# Patient Record
Sex: FEMALE | Race: WHITE | NOT HISPANIC OR LATINO | Employment: OTHER | RURAL
[De-identification: names, ages, dates, MRNs, and addresses within clinical notes are randomized per-mention and may not be internally consistent; named-entity substitution may affect disease eponyms.]

---

## 2022-08-14 ENCOUNTER — HOSPITAL ENCOUNTER (EMERGENCY)
Facility: HOSPITAL | Age: 77
Discharge: HOME | End: 2022-08-14
Attending: SPECIALIST
Payer: MEDICARE

## 2022-08-14 VITALS
RESPIRATION RATE: 18 BRPM | BODY MASS INDEX: 20.83 KG/M2 | DIASTOLIC BLOOD PRESSURE: 71 MMHG | HEART RATE: 76 BPM | WEIGHT: 125 LBS | TEMPERATURE: 98 F | SYSTOLIC BLOOD PRESSURE: 128 MMHG | OXYGEN SATURATION: 99 % | HEIGHT: 65 IN

## 2022-08-14 DIAGNOSIS — S81.811A LACERATION OF LOWER EXTREMITY, RIGHT, INITIAL ENCOUNTER: Primary | ICD-10-CM

## 2022-08-14 DIAGNOSIS — S90.30XA CONTUSION OF DORSUM OF FOOT: ICD-10-CM

## 2022-08-14 DIAGNOSIS — S81.811A LACERATION OF RIGHT LOWER EXTREMITY, INITIAL ENCOUNTER: ICD-10-CM

## 2022-08-14 PROCEDURE — 63600175 PHARM REV CODE 636 W HCPCS: Performed by: SPECIALIST

## 2022-08-14 PROCEDURE — 96372 THER/PROPH/DIAG INJ SC/IM: CPT

## 2022-08-14 PROCEDURE — 12032 INTMD RPR S/A/T/EXT 2.6-7.5: CPT | Performed by: SPECIALIST

## 2022-08-14 PROCEDURE — 12042 INTMD RPR N-HF/GENIT2.6-7.5: CPT

## 2022-08-14 PROCEDURE — 99284 EMERGENCY DEPT VISIT MOD MDM: CPT | Mod: 25

## 2022-08-14 PROCEDURE — 99283 EMERGENCY DEPT VISIT LOW MDM: CPT | Mod: 25 | Performed by: SPECIALIST

## 2022-08-14 PROCEDURE — 25000003 PHARM REV CODE 250: Performed by: SPECIALIST

## 2022-08-14 RX ORDER — ONDANSETRON 4 MG/1
8 TABLET, ORALLY DISINTEGRATING ORAL
Status: COMPLETED | OUTPATIENT
Start: 2022-08-14 | End: 2022-08-14

## 2022-08-14 RX ORDER — CELECOXIB 200 MG/1
200 CAPSULE ORAL
COMMUNITY

## 2022-08-14 RX ORDER — APIXABAN 5 MG/1
5 TABLET, FILM COATED ORAL 2 TIMES DAILY
COMMUNITY
Start: 2022-07-28

## 2022-08-14 RX ORDER — LIDOCAINE HYDROCHLORIDE 10 MG/ML
10 INJECTION, SOLUTION EPIDURAL; INFILTRATION; INTRACAUDAL; PERINEURAL
Status: COMPLETED | OUTPATIENT
Start: 2022-08-14 | End: 2022-08-14

## 2022-08-14 RX ORDER — CHOLECALCIFEROL (VITAMIN D3) 25 MCG
2500 TABLET,CHEWABLE ORAL
COMMUNITY

## 2022-08-14 RX ORDER — HYDROCODONE BITARTRATE AND ACETAMINOPHEN 5; 325 MG/1; MG/1
1 TABLET ORAL EVERY 4 HOURS PRN
Qty: 8 TABLET | Refills: 0 | Status: SHIPPED | OUTPATIENT
Start: 2022-08-14

## 2022-08-14 RX ORDER — ALBUTEROL SULFATE 90 UG/1
1-2 AEROSOL, METERED RESPIRATORY (INHALATION)
COMMUNITY

## 2022-08-14 RX ORDER — SULFAMETHOXAZOLE AND TRIMETHOPRIM 800; 160 MG/1; MG/1
1 TABLET ORAL
Status: COMPLETED | OUTPATIENT
Start: 2022-08-14 | End: 2022-08-14

## 2022-08-14 RX ORDER — GABAPENTIN 300 MG/1
300 CAPSULE ORAL
COMMUNITY

## 2022-08-14 RX ORDER — BUSPIRONE HYDROCHLORIDE 15 MG/1
15 TABLET ORAL 2 TIMES DAILY
COMMUNITY
Start: 2022-06-08

## 2022-08-14 RX ORDER — DILTIAZEM HYDROCHLORIDE 240 MG/1
240 CAPSULE, COATED, EXTENDED RELEASE ORAL DAILY
COMMUNITY
Start: 2022-06-08

## 2022-08-14 RX ORDER — SULFAMETHOXAZOLE AND TRIMETHOPRIM 800; 160 MG/1; MG/1
1 TABLET ORAL 2 TIMES DAILY
Qty: 14 TABLET | Refills: 0 | Status: SHIPPED | OUTPATIENT
Start: 2022-08-14 | End: 2022-08-21

## 2022-08-14 RX ORDER — ZOLPIDEM TARTRATE 10 MG/1
10 TABLET ORAL NIGHTLY
COMMUNITY
Start: 2022-08-05

## 2022-08-14 RX ORDER — MORPHINE SULFATE 2 MG/ML
2 INJECTION, SOLUTION INTRAMUSCULAR; INTRAVENOUS
Status: COMPLETED | OUTPATIENT
Start: 2022-08-14 | End: 2022-08-14

## 2022-08-14 RX ORDER — OMEPRAZOLE 40 MG/1
40 CAPSULE, DELAYED RELEASE ORAL
COMMUNITY

## 2022-08-14 RX ADMIN — LIDOCAINE HYDROCHLORIDE 100 MG: 10 INJECTION, SOLUTION EPIDURAL; INFILTRATION; INTRACAUDAL; PERINEURAL at 04:08

## 2022-08-14 RX ADMIN — MORPHINE SULFATE 2 MG: 2 INJECTION, SOLUTION INTRAMUSCULAR; INTRAVENOUS at 04:08

## 2022-08-14 RX ADMIN — SULFAMETHOXAZOLE AND TRIMETHOPRIM 1 TABLET: 800; 160 TABLET ORAL at 05:08

## 2022-08-14 RX ADMIN — ONDANSETRON 8 MG: 4 TABLET, ORALLY DISINTEGRATING ORAL at 04:08

## 2022-08-14 RX ADMIN — BACITRACIN ZINC, NEOMYCIN SULFATE, POLYMYXIN B SULFATE: 3.5; 5000; 4 OINTMENT TOPICAL at 05:08

## 2022-08-14 NOTE — ED NOTES
Pt to er 5 on arrival- pt anxious and hollering - pt states your not putting that nonsterile water on me as nurse reaches for washcloth to wash dirt and blood off of patients foot- pt holding towel around lower leg- pt states your not touching me- explained to pt that I could not help her unless she let me

## 2022-08-14 NOTE — DISCHARGE INSTRUCTIONS
Elevate right leg when at home  DO NOT WALK AT HOME WITHOUT CRUTCHES OR WALKER AND DO NOT PUT PRESSURE ON RIGHT LEG  FOLLOW UP IN ER IN 24 HRS FOR REEVALUATION OR AT PCP  FOLLOW UP IN 10 DAYS FOR PROBABLE SUTURE REMOVAL

## 2022-08-14 NOTE — ED NOTES
Wound cleansed with saline and hibiclens -antibiotic ointment applied and nonstick bandage- pt tolerated - covered with 4x4's and jack- ace wrap applied as ordered - circulation wnl's

## 2022-08-14 NOTE — ED NOTES
Dr simon aware of pt arrival - pt sister came to nurses station stating she needs to go to bathroom- pt assisted to wheelchair - pt then assisted to bathroom - pt, you put your finger right here and hold my towel intact until I get done -pt states only one finger and dont you move- assisted pt back to room after voiding - explained to pt that I had to clean her wound for her doctor to see her and that know one was going to do anything to hurt- pt now states whats your name and where are you from - explained my name- pt sister states you know her mom and explained who nurse was - pt became calmer - pt now allowing cleansing of wound- pt states her great nicolas and yordan were playing and her great nicolas landed on her foot on and cut her with her toenail - bleeding controlled- wound cleansed with saline and hibiclens and irrigation performed

## 2022-08-14 NOTE — ED PROVIDER NOTES
Encounter Date: 8/14/2022       History     Chief Complaint   Patient presents with    Laceration     Pt  to er via wheelchair with c/o laceration to leg prior to arrival - states her dog didn't meant to hurt her     Patient is a 77 yo wf who comes to the ER for evaluation and treatment of a large laceration on the right calf.  Patient states that her Great Alexander was playing with her smaller dog today and by mistake clawed her leg and stepped on her right foot. She is in pain.  The lac measures 6 cm x 1.1 cm right calf.         Review of patient's allergies indicates:   Allergen Reactions    Penicillins Hives     History reviewed. No pertinent past medical history.  History reviewed. No pertinent surgical history.  History reviewed. No pertinent family history.  Social History     Tobacco Use    Smoking status: Former Smoker    Smokeless tobacco: Never Used   Substance Use Topics    Alcohol use: Yes     Review of Systems   Constitutional: Negative.    HENT: Negative.    Eyes: Negative.    Respiratory: Negative.    Endocrine: Negative.    Genitourinary: Negative.    Musculoskeletal: Negative.         Right calf pain and lac   Skin: Positive for wound.        Laceration right calf 6 cm x 1.1 cm non bleeding fascia intact.  Patients skin is extremely thin and crepy   Allergic/Immunologic: Negative.    Neurological: Negative.    Hematological: Negative.    Psychiatric/Behavioral: Negative.    All other systems reviewed and are negative.      Physical Exam     Initial Vitals [08/14/22 1553]   BP Pulse Resp Temp SpO2   136/84 82 (!) 22 98.2 °F (36.8 °C) 98 %      MAP       --         Physical Exam    Nursing note and vitals reviewed.  Constitutional: She appears well-developed and well-nourished. She appears distressed.   HENT:   Head: Normocephalic and atraumatic.   Mouth/Throat: Oropharynx is clear and moist.   Eyes: Conjunctivae are normal. Pupils are equal, round, and reactive to light.   Neck: Neck supple.    Normal range of motion.  Musculoskeletal:         General: Normal range of motion.      Cervical back: Normal range of motion and neck supple.      Comments: Right foot dorsum: ecchymoses on the lateral part of the dorsum right foot     Neurological: She is alert and oriented to person, place, and time. She has normal strength.   Skin: Skin is warm.   Right calf: 6 cm x 1.1 cm laceration fascia is non disrupted non bleeding  There is bruising on the dorsum of the right foot   Psychiatric: Thought content normal.   Patient is anxious         Medical Screening Exam   See Full Note    ED Course   Lac Repair    Date/Time: 8/14/2022 5:33 PM  Performed by: Katie Hawkins MD  Authorized by: Katie Hawkins MD     Consent:     Consent obtained:  Verbal    Consent given by:  Patient    Risks, benefits, and alternatives were discussed: yes      Risks discussed:  Infection, pain, poor cosmetic result and need for additional repair  Universal protocol:     Procedure explained and questions answered to patient or proxy's satisfaction: yes      Imaging studies available: yes      Required blood products, implants, devices, and special equipment available: yes      Patient identity confirmed:  Verbally with patient and arm band  Anesthesia:     Anesthesia method:  Local infiltration    Local anesthetic:  Lidocaine 1% w/o epi  Laceration details:     Location:  Leg    Leg location:  R lower leg    Length (cm):  6    Depth (mm):  1.1  Pre-procedure details:     Preparation:  Patient was prepped and draped in usual sterile fashion  Exploration:     Limited defect created (wound extended): no      Hemostasis achieved with:  Direct pressure    Imaging obtained: x-ray      Imaging outcome: foreign body not noted      Wound exploration: entire depth of wound visualized      Contaminated: yes    Treatment:     Area cleansed with:  Chlorhexidine and saline    Amount of cleaning:  Extensive    Irrigation solution:  Sterile water     Irrigation volume:  100 ml    Irrigation method:  Syringe    Visualized foreign bodies/material removed: no      Debridement:  None    Undermining:  None    Scar revision: no      Layers/structures repaired:  Deep dermal/superficial fascia  Deep dermal/superficial fascia:     Suture size:  4-0    Suture material:  Vicryl    Suture technique:  Simple interrupted    Number of sutures:  3  Skin repair:     Repair method:  Sutures    Suture size:  4-0    Suture material:  Nylon    Suture technique:  Simple interrupted    Number of sutures:  12  Approximation:     Approximation:  Close  Repair type:     Repair type:  Intermediate  Post-procedure details:     Dressing:  Antibiotic ointment, non-adherent dressing, bulky dressing and adhesive bandage    Procedure completion:  Tolerated  Comments:      There is a 1 cm x 1.1 cm area that could not be sewn and will heal with secondary intention.  Patient informed about this and it was discussed thoroughly.  She is to use a walker for ambulation without pressure to right leg and she will need to come back for evaluation in 24 hrs      Labs Reviewed - No data to display       Imaging Results          X-Ray Foot Complete Right (Final result)  Result time 08/14/22 17:18:26    Final result by Abner Ashley MD (08/14/22 17:18:26)                 Impression:      No acute bony abnormality.  Osteoarthritis.  No radiopaque foreign body      Electronically signed by: Abner Ashley  Date:    08/14/2022  Time:    17:18             Narrative:    EXAMINATION:  XR FOOT COMPLETE 3 VIEW RIGHT    CLINICAL HISTORY:  .  Contusion of unspecified foot, initial encounter laceration.    COMPARISON:  No previous similar    TECHNIQUE:  AP, lateral, and oblique views right foot    FINDINGS:  There is no acute fracture or dislocation.  There is mild joint space narrowing, eburnation, and osteophyte formation at the 1st MTP joint.  No radiopaque foreign body is seen                                  Medications   sulfamethoxazole-trimethoprim 800-160mg per tablet 1 tablet (has no administration in time range)   neomycin-bacitracnZn-polymyxnB packet (has no administration in time range)   morphine injection 2 mg (2 mg Intramuscular Given 8/14/22 1643)   ondansetron disintegrating tablet 8 mg (8 mg Oral Given 8/14/22 1644)   LIDOcaine (PF) 10 mg/ml (1%) injection 100 mg (100 mg Infiltration Given 8/14/22 1645)                       Clinical Impression:   Final diagnoses:  [S90.30XA] Contusion of dorsum of foot  [S81.811A] Laceration of lower extremity, right, initial encounter (Primary)  [S81.811A] Laceration of right lower extremity, initial encounter          ED Disposition Condition    Discharge Stable        ED Prescriptions     Medication Sig Dispense Start Date End Date Auth. Provider    sulfamethoxazole-trimethoprim 800-160mg (BACTRIM DS) 800-160 mg Tab Take 1 tablet by mouth 2 (two) times daily. for 7 days 14 tablet 8/14/2022 8/21/2022 Katie Hawkins MD    HYDROcodone-acetaminophen (NORCO) 5-325 mg per tablet Take 1 tablet by mouth every 4 (four) hours as needed for Pain. 8 tablet 8/14/2022  Katie Hawkins MD        Follow-up Information    None          Katie Hawkins MD  08/14/22 1737       Katie Hawkins MD  08/14/22 3924

## 2022-08-16 ENCOUNTER — TELEPHONE (OUTPATIENT)
Dept: EMERGENCY MEDICINE | Facility: HOSPITAL | Age: 77
End: 2022-08-16
Payer: MEDICARE

## 2024-04-15 ENCOUNTER — HOSPITAL ENCOUNTER (EMERGENCY)
Facility: HOSPITAL | Age: 79
Discharge: ANOTHER HOSPITAL | End: 2024-04-15
Attending: INTERNAL MEDICINE
Payer: MEDICARE

## 2024-04-15 VITALS
SYSTOLIC BLOOD PRESSURE: 114 MMHG | OXYGEN SATURATION: 94 % | HEART RATE: 90 BPM | WEIGHT: 134.63 LBS | DIASTOLIC BLOOD PRESSURE: 73 MMHG | TEMPERATURE: 98 F | HEIGHT: 65 IN | BODY MASS INDEX: 22.43 KG/M2 | RESPIRATION RATE: 17 BRPM

## 2024-04-15 DIAGNOSIS — J96.21 ACUTE ON CHRONIC RESPIRATORY FAILURE WITH HYPOXIA: ICD-10-CM

## 2024-04-15 DIAGNOSIS — Z79.01 CHRONIC ANTICOAGULATION: ICD-10-CM

## 2024-04-15 DIAGNOSIS — J44.0 COPD WITH PNEUMONIA: ICD-10-CM

## 2024-04-15 DIAGNOSIS — J18.9 COPD WITH PNEUMONIA: ICD-10-CM

## 2024-04-15 DIAGNOSIS — I48.20 CHRONIC ATRIAL FIBRILLATION: ICD-10-CM

## 2024-04-15 DIAGNOSIS — K92.0 GASTROINTESTINAL HEMORRHAGE WITH HEMATEMESIS: Primary | ICD-10-CM

## 2024-04-15 DIAGNOSIS — I48.91 ATRIAL FIBRILLATION: ICD-10-CM

## 2024-04-15 LAB
ALBUMIN SERPL BCP-MCNC: 3.1 G/DL (ref 3.5–5)
ALBUMIN/GLOB SERPL: 0.8 {RATIO}
ALP SERPL-CCNC: 141 U/L (ref 55–142)
ALT SERPL W P-5'-P-CCNC: 40 U/L (ref 13–56)
ANION GAP SERPL CALCULATED.3IONS-SCNC: 18 MMOL/L (ref 7–16)
APTT PPP: 46.6 SECONDS (ref 25.2–37.3)
AST SERPL W P-5'-P-CCNC: 30 U/L (ref 15–37)
BACTERIA #/AREA URNS HPF: NORMAL /HPF
BASOPHILS # BLD AUTO: 0.04 K/UL (ref 0–0.2)
BASOPHILS # BLD AUTO: 0.04 K/UL (ref 0–0.2)
BASOPHILS NFR BLD AUTO: 0.2 % (ref 0–1)
BASOPHILS NFR BLD AUTO: 0.2 % (ref 0–1)
BILIRUB SERPL-MCNC: 0.7 MG/DL (ref ?–1.2)
BILIRUB UR QL STRIP: NEGATIVE
BUN SERPL-MCNC: 26 MG/DL (ref 7–18)
BUN/CREAT SERPL: 20 (ref 6–20)
CALCIUM SERPL-MCNC: 8.8 MG/DL (ref 8.5–10.1)
CHLORIDE SERPL-SCNC: 97 MMOL/L (ref 98–107)
CLARITY UR: CLEAR
CO2 SERPL-SCNC: 23 MMOL/L (ref 21–32)
COLOR UR: YELLOW
CREAT SERPL-MCNC: 1.28 MG/DL (ref 0.55–1.02)
DIFFERENTIAL METHOD BLD: ABNORMAL
DIFFERENTIAL METHOD BLD: ABNORMAL
EGFR (NO RACE VARIABLE) (RUSH/TITUS): 43 ML/MIN/1.73M2
EOSINOPHIL # BLD AUTO: 0.01 K/UL (ref 0–0.5)
EOSINOPHIL # BLD AUTO: 0.01 K/UL (ref 0–0.5)
EOSINOPHIL NFR BLD AUTO: 0 % (ref 1–4)
EOSINOPHIL NFR BLD AUTO: 0 % (ref 1–4)
ERYTHROCYTE [DISTWIDTH] IN BLOOD BY AUTOMATED COUNT: 12.4 % (ref 11.5–14.5)
ERYTHROCYTE [DISTWIDTH] IN BLOOD BY AUTOMATED COUNT: 12.4 % (ref 11.5–14.5)
GLOBULIN SER-MCNC: 3.9 G/DL (ref 2–4)
GLUCOSE SERPL-MCNC: 126 MG/DL (ref 74–106)
GLUCOSE UR STRIP-MCNC: NEGATIVE MG/DL
HCO3 UR-SCNC: 22.7 MMOL/L (ref 21–28)
HCT VFR BLD AUTO: 39.7 % (ref 38–47)
HCT VFR BLD AUTO: 41.2 % (ref 38–47)
HGB BLD-MCNC: 13.7 G/DL (ref 12–16)
HGB BLD-MCNC: 14.4 G/DL (ref 12–16)
IMM GRANULOCYTES # BLD AUTO: 0.12 K/UL (ref 0–0.04)
IMM GRANULOCYTES # BLD AUTO: 0.12 K/UL (ref 0–0.04)
IMM GRANULOCYTES NFR BLD: 0.5 % (ref 0–0.4)
IMM GRANULOCYTES NFR BLD: 0.6 % (ref 0–0.4)
INR BLD: 1.93
KETONES UR STRIP-SCNC: NEGATIVE MG/DL
LACTATE SERPL-SCNC: 1.5 MMOL/L (ref 0.4–2)
LEUKOCYTE ESTERASE UR QL STRIP: NEGATIVE
LIPASE SERPL-CCNC: 17 U/L (ref 16–77)
LYMPHOCYTES # BLD AUTO: 0.79 K/UL (ref 1–4.8)
LYMPHOCYTES # BLD AUTO: 1.05 K/UL (ref 1–4.8)
LYMPHOCYTES NFR BLD AUTO: 3.2 % (ref 27–41)
LYMPHOCYTES NFR BLD AUTO: 5 % (ref 27–41)
LYMPHOCYTES NFR BLD MANUAL: 7 % (ref 27–41)
MCH RBC QN AUTO: 33.6 PG (ref 27–31)
MCH RBC QN AUTO: 33.7 PG (ref 27–31)
MCHC RBC AUTO-ENTMCNC: 34.5 G/DL (ref 32–36)
MCHC RBC AUTO-ENTMCNC: 35 G/DL (ref 32–36)
MCV RBC AUTO: 96.3 FL (ref 80–96)
MCV RBC AUTO: 97.8 FL (ref 80–96)
MONOCYTES # BLD AUTO: 0.96 K/UL (ref 0–0.8)
MONOCYTES # BLD AUTO: 1.23 K/UL (ref 0–0.8)
MONOCYTES NFR BLD AUTO: 4.6 % (ref 2–6)
MONOCYTES NFR BLD AUTO: 5 % (ref 2–6)
MONOCYTES NFR BLD MANUAL: 5 % (ref 2–6)
MPC BLD CALC-MCNC: 10.5 FL (ref 9.4–12.4)
MPC BLD CALC-MCNC: 10.6 FL (ref 9.4–12.4)
NEUTROPHILS # BLD AUTO: 18.68 K/UL (ref 1.8–7.7)
NEUTROPHILS # BLD AUTO: 22.17 K/UL (ref 1.8–7.7)
NEUTROPHILS NFR BLD AUTO: 89.6 % (ref 53–65)
NEUTROPHILS NFR BLD AUTO: 91.1 % (ref 53–65)
NEUTS BAND NFR BLD MANUAL: 4 % (ref 1–5)
NEUTS SEG NFR BLD MANUAL: 84 % (ref 50–62)
NITRITE UR QL STRIP: NEGATIVE
NRBC # BLD AUTO: 0 X10E3/UL
NRBC # BLD AUTO: 0 X10E3/UL
NRBC, AUTO (.00): 0 %
NRBC, AUTO (.00): 0 %
OCCULT BLOOD: POSITIVE
OHS QRS DURATION: 76 MS
OHS QTC CALCULATION: 452 MS
PCO2 BLDA: 35 MMHG (ref 35–48)
PH SMN: 7.42 [PH] (ref 7.35–7.45)
PH UR STRIP: 5 PH UNITS
PLATELET # BLD AUTO: 197 K/UL (ref 150–400)
PLATELET # BLD AUTO: 203 K/UL (ref 150–400)
PLATELET MORPHOLOGY: NORMAL
PO2 BLDA: 46 MMHG (ref 83–108)
POC BASE EXCESS: -1.3 MMOL/L (ref -2–3)
POC SATURATED O2: 83 %
POTASSIUM SERPL-SCNC: 3.4 MMOL/L (ref 3.5–5.1)
PROT SERPL-MCNC: 7 G/DL (ref 6.4–8.2)
PROT UR QL STRIP: ABNORMAL
PROTHROMBIN TIME: 21.7 SECONDS (ref 11.7–14.7)
RBC # BLD AUTO: 4.06 M/UL (ref 4.2–5.4)
RBC # BLD AUTO: 4.28 M/UL (ref 4.2–5.4)
RBC # UR STRIP: ABNORMAL /UL
RBC #/AREA URNS HPF: NORMAL /HPF
RBC MORPH BLD: NORMAL
SODIUM SERPL-SCNC: 135 MMOL/L (ref 136–145)
SP GR UR STRIP: <=1.005
SQUAMOUS #/AREA URNS LPF: NORMAL /LPF
TROPONIN I SERPL DL<=0.01 NG/ML-MCNC: 5.7 PG/ML
UROBILINOGEN UR STRIP-ACNC: 0.2 MG/DL
WBC # BLD AUTO: 20.86 K/UL (ref 4.5–11)
WBC # BLD AUTO: 24.36 K/UL (ref 4.5–11)
WBC #/AREA URNS HPF: NORMAL /HPF

## 2024-04-15 PROCEDURE — 25000242 PHARM REV CODE 250 ALT 637 W/ HCPCS: Performed by: INTERNAL MEDICINE

## 2024-04-15 PROCEDURE — 99285 EMERGENCY DEPT VISIT HI MDM: CPT | Mod: 25 | Performed by: INTERNAL MEDICINE

## 2024-04-15 PROCEDURE — 99285 EMERGENCY DEPT VISIT HI MDM: CPT | Mod: 25

## 2024-04-15 PROCEDURE — 99900035 HC TECH TIME PER 15 MIN (STAT)

## 2024-04-15 PROCEDURE — 25000003 PHARM REV CODE 250: Performed by: INTERNAL MEDICINE

## 2024-04-15 PROCEDURE — 80053 COMPREHEN METABOLIC PANEL: CPT | Performed by: INTERNAL MEDICINE

## 2024-04-15 PROCEDURE — 87040 BLOOD CULTURE FOR BACTERIA: CPT | Performed by: INTERNAL MEDICINE

## 2024-04-15 PROCEDURE — 82272 OCCULT BLD FECES 1-3 TESTS: CPT | Performed by: INTERNAL MEDICINE

## 2024-04-15 PROCEDURE — 25000003 PHARM REV CODE 250

## 2024-04-15 PROCEDURE — 36600 WITHDRAWAL OF ARTERIAL BLOOD: CPT

## 2024-04-15 PROCEDURE — 63600175 PHARM REV CODE 636 W HCPCS: Performed by: INTERNAL MEDICINE

## 2024-04-15 PROCEDURE — 96365 THER/PROPH/DIAG IV INF INIT: CPT

## 2024-04-15 PROCEDURE — 96361 HYDRATE IV INFUSION ADD-ON: CPT

## 2024-04-15 PROCEDURE — 85730 THROMBOPLASTIN TIME PARTIAL: CPT | Performed by: INTERNAL MEDICINE

## 2024-04-15 PROCEDURE — 96366 THER/PROPH/DIAG IV INF ADDON: CPT

## 2024-04-15 PROCEDURE — 27000221 HC OXYGEN, UP TO 24 HOURS

## 2024-04-15 PROCEDURE — 85025 COMPLETE CBC W/AUTO DIFF WBC: CPT | Performed by: INTERNAL MEDICINE

## 2024-04-15 PROCEDURE — 83605 ASSAY OF LACTIC ACID: CPT | Performed by: INTERNAL MEDICINE

## 2024-04-15 PROCEDURE — 85610 PROTHROMBIN TIME: CPT | Performed by: INTERNAL MEDICINE

## 2024-04-15 PROCEDURE — 93005 ELECTROCARDIOGRAM TRACING: CPT

## 2024-04-15 PROCEDURE — 81001 URINALYSIS AUTO W/SCOPE: CPT | Performed by: INTERNAL MEDICINE

## 2024-04-15 PROCEDURE — 93010 ELECTROCARDIOGRAM REPORT: CPT | Mod: ,,, | Performed by: INTERNAL MEDICINE

## 2024-04-15 PROCEDURE — 25500020 PHARM REV CODE 255: Performed by: INTERNAL MEDICINE

## 2024-04-15 PROCEDURE — 82803 BLOOD GASES ANY COMBINATION: CPT

## 2024-04-15 PROCEDURE — 94760 N-INVAS EAR/PLS OXIMETRY 1: CPT

## 2024-04-15 PROCEDURE — 94640 AIRWAY INHALATION TREATMENT: CPT

## 2024-04-15 PROCEDURE — 84484 ASSAY OF TROPONIN QUANT: CPT | Performed by: INTERNAL MEDICINE

## 2024-04-15 PROCEDURE — 83690 ASSAY OF LIPASE: CPT | Performed by: INTERNAL MEDICINE

## 2024-04-15 RX ORDER — SODIUM CHLORIDE 9 MG/ML
INJECTION, SOLUTION INTRAVENOUS
Status: COMPLETED
Start: 2024-04-15 | End: 2024-04-15

## 2024-04-15 RX ORDER — CEFDINIR 300 MG/1
300 CAPSULE ORAL 2 TIMES DAILY
COMMUNITY
Start: 2024-04-05

## 2024-04-15 RX ORDER — GABAPENTIN 400 MG/1
400 CAPSULE ORAL 3 TIMES DAILY
COMMUNITY

## 2024-04-15 RX ORDER — LEVOFLOXACIN 5 MG/ML
750 INJECTION, SOLUTION INTRAVENOUS
Status: COMPLETED | OUTPATIENT
Start: 2024-04-15 | End: 2024-04-15

## 2024-04-15 RX ORDER — SODIUM CHLORIDE 9 MG/ML
1000 INJECTION, SOLUTION INTRAVENOUS
Status: COMPLETED | OUTPATIENT
Start: 2024-04-15 | End: 2024-04-15

## 2024-04-15 RX ORDER — CLONAZEPAM 0.5 MG/1
0.5 TABLET ORAL 2 TIMES DAILY
COMMUNITY
Start: 2024-02-29

## 2024-04-15 RX ORDER — IPRATROPIUM BROMIDE AND ALBUTEROL SULFATE 2.5; .5 MG/3ML; MG/3ML
3 SOLUTION RESPIRATORY (INHALATION)
Status: COMPLETED | OUTPATIENT
Start: 2024-04-15 | End: 2024-04-15

## 2024-04-15 RX ORDER — EVOLOCUMAB 140 MG/ML
INJECTION, SOLUTION SUBCUTANEOUS
COMMUNITY
Start: 2024-04-05

## 2024-04-15 RX ORDER — SERTRALINE HYDROCHLORIDE 50 MG/1
1 TABLET, FILM COATED ORAL DAILY
COMMUNITY
Start: 2024-01-19

## 2024-04-15 RX ADMIN — SODIUM CHLORIDE 1000 ML: 0.9 INJECTION, SOLUTION INTRAVENOUS at 04:04

## 2024-04-15 RX ADMIN — IOPAMIDOL 40 ML: 755 INJECTION, SOLUTION INTRAVENOUS at 11:04

## 2024-04-15 RX ADMIN — SODIUM CHLORIDE: 0.9 INJECTION, SOLUTION INTRAVENOUS at 05:04

## 2024-04-15 RX ADMIN — SODIUM CHLORIDE 500 ML: 9 INJECTION, SOLUTION INTRAVENOUS at 11:04

## 2024-04-15 RX ADMIN — IPRATROPIUM BROMIDE AND ALBUTEROL SULFATE 3 ML: .5; 3 SOLUTION RESPIRATORY (INHALATION) at 02:04

## 2024-04-15 RX ADMIN — IOPAMIDOL 45 ML: 755 INJECTION, SOLUTION INTRAVENOUS at 11:04

## 2024-04-15 RX ADMIN — LEVOFLOXACIN 750 MG: 750 INJECTION, SOLUTION INTRAVENOUS at 12:04

## 2024-04-15 NOTE — ED TRIAGE NOTES
Pt ambulatory to er with c/o rectal bleeding for 3 days - pt c/o decreased appetite and abd discomfort- states she last saw her pmd 's nurse practitioner earlier in April - pt was placed on cefdinir and states she stopped taking it due to having a yeast infection

## 2024-04-15 NOTE — ED PROVIDER NOTES
Encounter Date: 4/15/2024       History     Chief Complaint   Patient presents with    Rectal Bleeding     Patient states she had bright red blood in his stool for the last 3-4 days.  Denies abdominal pain nausea vomiting or diarrhea.  Denies any chest pain but mild shortness breath.  The patient is on Eliquis for chronic paroxysmal atrial fibrillation.  No fever chills, seen by her primary care provider on Friday.    The patient had rectal bleeding diagnosed in 2021, she was referred to gastroenterologist in [place], her colonoscopy showed hemorrhoids.        Review of patient's allergies indicates:   Allergen Reactions    Penicillins Hives     Past Medical History:   Diagnosis Date    Anemia, iron deficiency     Anxiety disorder, unspecified     COPD (chronic obstructive pulmonary disease)     Hypercholesterolemia     Hypertension     Insomnia     Lumbago with sciatica     Paroxysmal atrial fibrillation     Solitary pulmonary nodule     ct of chest ordered 4/5/24 with iv contrast per crnp     Past Surgical History:   Procedure Laterality Date    breast implants      cataract surgery      COLON RESECTION      colonscopy      2022 at [facility] with normal result per pt    HYSTERECTOMY       No family history on file.  Social History     Tobacco Use    Smoking status: Former    Smokeless tobacco: Never   Substance Use Topics    Alcohol use: Yes     Comment: liquor have not had in 3 days    Drug use: Never     Review of Systems   Constitutional:  Negative for fever.   HENT:  Negative for sore throat.    Respiratory:  Negative for shortness of breath.    Cardiovascular:  Negative for chest pain.   Gastrointestinal:  Negative for nausea.   Genitourinary:  Negative for dysuria.   Musculoskeletal:  Negative for back pain.   Skin:  Negative for rash.   Neurological:  Negative for weakness.   Hematological:  Does not bruise/bleed easily.       Physical Exam     Initial Vitals [04/15/24 1004]   BP Pulse Resp Temp SpO2   102/65 104  18 98.3 °F (36.8 °C) (!) 91 %      MAP       --         Physical Exam    Vitals reviewed.  Constitutional: She appears well-developed.   Eyes: Pupils are equal, round, and reactive to light.   Neck:   Normal range of motion.  Cardiovascular:  Normal rate, regular rhythm, normal heart sounds and normal pulses.           Pulmonary/Chest: Effort normal. She has rhonchi.   Abdominal: Abdomen is soft and flat. Bowel sounds are normal. There is no abdominal tenderness.   Musculoskeletal:         General: Normal range of motion.      Cervical back: Normal range of motion.     Neurological: She is alert. She has normal strength and normal reflexes. No cranial nerve deficit. GCS eye subscore is 4. GCS verbal subscore is 5. GCS motor subscore is 6.   Skin: Skin is warm.   Psychiatric: She has a normal mood and affect.         Medical Screening Exam   See Full Note    ED Course   Procedures  Labs Reviewed   COMPREHENSIVE METABOLIC PANEL - Abnormal; Notable for the following components:       Result Value    Sodium 135 (*)     Potassium 3.4 (*)     Chloride 97 (*)     Anion Gap 18 (*)     Glucose 126 (*)     BUN 26 (*)     Creatinine 1.28 (*)     Albumin 3.1 (*)     eGFR 43 (*)     All other components within normal limits   PROTIME-INR - Abnormal; Notable for the following components:    PT 21.7 (*)     All other components within normal limits   APTT - Abnormal; Notable for the following components:    PTT 46.6 (*)     All other components within normal limits   CBC WITH DIFFERENTIAL - Abnormal; Notable for the following components:    WBC 24.36 (*)     MCV 96.3 (*)     MCH 33.6 (*)     Neutrophils % 91.1 (*)     Lymphocytes % 3.2 (*)     Eosinophils % 0.0 (*)     Immature Granulocytes % 0.5 (*)     Neutrophils, Abs 22.17 (*)     Lymphocytes, Absolute 0.79 (*)     Monocytes, Absolute 1.23 (*)     Immature Granulocytes, Absolute 0.12 (*)     All other components within normal limits   MANUAL DIFFERENTIAL - Abnormal; Notable  for the following components:    Segmented Neutrophils, Man % 84 (*)     Lymphocytes, Man % 7 (*)     All other components within normal limits   OCCULT BLOOD X 1, STOOL - Abnormal; Notable for the following components:    Occult Blood Positive (*)     All other components within normal limits   URINALYSIS, REFLEX TO URINE CULTURE - Abnormal; Notable for the following components:    Protein, UA Trace (*)     Blood, UA Trace-Intact (*)     All other components within normal limits   CBC WITH DIFFERENTIAL - Abnormal; Notable for the following components:    WBC 20.86 (*)     RBC 4.06 (*)     MCV 97.8 (*)     MCH 33.7 (*)     Neutrophils % 89.6 (*)     Lymphocytes % 5.0 (*)     Eosinophils % 0.0 (*)     Immature Granulocytes % 0.6 (*)     Neutrophils, Abs 18.68 (*)     Monocytes, Absolute 0.96 (*)     Immature Granulocytes, Absolute 0.12 (*)     All other components within normal limits   LIPASE - Normal   LACTIC ACID, PLASMA - Normal   TROPONIN I - Normal   URINALYSIS, MICROSCOPIC - Normal   CULTURE, BLOOD   CULTURE, BLOOD   CBC W/ AUTO DIFFERENTIAL    Narrative:     The following orders were created for panel order CBC auto differential.  Procedure                               Abnormality         Status                     ---------                               -----------         ------                     CBC with Differential[[id number]]        Abnormal            Final result               Manual Differential[[id number]]         Abnormal            Final result                 Please view results for these tests on the individual orders.   CBC W/ AUTO DIFFERENTIAL    Narrative:     The following orders were created for panel order CBC auto differential.  Procedure                               Abnormality         Status                     ---------                               -----------         ------                     CBC with Differential[[id number]]       Abnormal            Final result                  Please view results for these tests on the individual orders.     EKG Readings: (Independently Interpreted)   Initial Reading: No STEMI. Rhythm: Normal Sinus Rhythm. Heart Rate: 94. Ectopy: No Ectopy. Conduction: Normal. ST Segments: Normal ST Segments. T Waves: Normal. Axis: Normal. Clinical Impression: Normal Sinus Rhythm   Normal sinus rhythm with a heart rate of 94 and no acute ischemic changes or atrial fibrillation     ECG Results              EKG 12-lead (Final result)        Collection Time Result Time QRS Duration OHS QTC Calculation    04/15/24 10:18:41 04/15/24 15:55:50 76 452                     Final result by Interface, Lab In [facility] (04/15/24 15:55:58)                   Narrative:    Test Reason : I48.91,    Vent. Rate : 094 BPM     Atrial Rate : 094 BPM     P-R Int : 134 ms          QRS Dur : 076 ms      QT Int : 362 ms       P-R-T Axes : 056 015 073 degrees     QTc Int : 452 ms    Normal sinus rhythm  Cannot rule out Anterior infarct ,age undetermined  Abnormal ECG  No previous ECGs available  Confirmed by Kirill Gonzáles DO ([id number]) on 4/15/2024 3:55:49 PM    Referred By: [email or web address]   SELF           Confirmed By:Kirill Gonzáles DO                                  Imaging Results              CTA Chest Non-Coronary (PE Studies) (Final result)  Result time 04/15/24 11:54:30      Final result by Claudio Guzman II, MD (04/15/24 11:54:30)                   Impression:      No evidence of pulmonary thromboembolism.  Scattered areas of airspace density could indicate pneumonitis.      Electronically signed by: Claudio Guzman  Date:    04/15/2024  Time:    11:54               Narrative:    EXAMINATION:  CTA CHEST NON CORONARY (PE STUDIES)    CLINICAL HISTORY:  Pulmonary embolism (PE) suspected, neg D-dimer;    TECHNIQUE:  Axial CT imaging of the chest is performed with intravenous contrast. Contrast dose is 100 cc Isovue 370.    CT dose reduction technique used - Dose Rite and tube current  modulation.    COMPARISON:  None available    FINDINGS:  No thrombus or other abnormality is identified in the pulmonary arteries or veins.  The pulmonary vessel caliber is within normal limits.  The heart, mediastinum and great vessels appear within normal limits.    Small amount of scattered pulmonary airspace density present.  Remaining pulmonary parenchyma shows no evidence of airspace disease or abnormal density.  No effusion or pneumothorax is present.                                       CT Abdomen Pelvis With IV Contrast NO Oral Contrast (Final result)  Result time 04/15/24 11:51:55      Final result by Claudio Guzman II, MD (04/15/24 11:51:55)                   Impression:      Findings suggest diverticulitis sigmoid colon.  No other definite acute findings.      Electronically signed by: Claudio Guzman  Date:    04/15/2024  Time:    11:51               Narrative:    EXAMINATION:  CT ABDOMEN PELVIS WITH IV CONTRAST    CLINICAL HISTORY:  GI BLEED;    TECHNIQUE:  Axial CT imaging of the abdomen and pelvis is performed with intravenous contrast. Contrast dose is 100 cc Isovue 370.    CT dose reduction technique used - Dose Rite and tube current modulation.    COMPARISON:  26 July 2022    FINDINGS:  Cardiac and lung bases are within normal limits    CT abdomen: The liver, spleen, pancreas and adrenal glands are normal in size and enhancement.  No evidence of focal lesion is demonstrated in these solid organs.    Kidneys are normal in size and enhancement.  No evidence of hydronephrosis or nephrolithiasis is seen.    The bowel caliber is normal and no wall thickening or adjacent inflammatory change is seen.  No evidence of free fluid or free air is present.  Appendix is not seen.    CT pelvis: Multiple diverticula in the colon with slight amount wall thickening and stranding in the sigmoid colon.  Remaining pelvic bowel appears within normal limits.  Bladder shows no evidence of abnormality.  The uterus  and ovaries not identified.                                       XR ABDOMEN, ACUTE 2 OR MORE VIEWS WITH CHEST (Final result)  Result time 04/15/24 10:38:44      Final result by Claudio Guzman II, MD (04/15/24 10:38:44)                   Impression:      No evidence of abnormality demonstrated.      Electronically signed by: Claudio Guzman  Date:    04/15/2024  Time:    10:38               Narrative:    EXAMINATION:  XR ABDOMEN ACUTE 2 OR MORE VIEWS WITH CHEST    CLINICAL HISTORY:  Abdominal pain GI BLEED;    COMPARISON:  26 July 2019    TECHNIQUE:  XR ABDOMEN 2 VIEWS WITH CHEST    FINDINGS:  No free fluid or free air seen.  The bowel gas pattern appears within normal limits.  No abnormal calcifications are present. Chest shows no evidence of abnormality. No other abnormality is identified.                                       Medications   sodium chloride 0.9% bolus 500 mL 500 mL (0 mLs Intravenous Stopped 4/15/24 1258)   iopamidoL (ISOVUE-370) injection 100 mL (40 mLs Intravenous Given 4/15/24 1143)   iopamidoL (ISOVUE-370) injection 100 mL (45 mLs Intravenous Given 4/15/24 1148)   levoFLOXacin 750 mg/150 mL IVPB 750 mg (0 mg Intravenous Stopped 4/15/24 1455)   albuterol-ipratropium 2.5 mg-0.5 mg/3 mL nebulizer solution 3 mL (3 mLs Nebulization Given 4/15/24 1405)   0.9%  NaCl infusion (1,000 mLs Intravenous New Bag 4/15/24 1615)   sodium chloride 0.9% infusion ( Intravenous New Bag 4/15/24 1700)     Medical Decision Making  Patient was rectal bleeding for last 3-4 days rule out severe anemia, upper or lower GI bleeding, patient with low oxygen rule out pneumonia, congestive heart failure, bacterial viral infection.    Amount and/or Complexity of Data Reviewed  Labs: ordered. Decision-making details documented in ED Course.  Radiology: ordered. Decision-making details documented in ED Course.  Discussion of management or test interpretation with external provider(s): PATIENT WHITE BLOOD CELL COUNT [id number], HER  OXYGEN LEVEL 83% SAT WITH PO2 OF 46 ON ABG BUT A NORMAL PH OF 7.42 AND PCO2 OF 35.  Patient denies any shortness breath and is on Eliquis for chronic atrial fibrillation.  Patient has a history of COPD but not on oxygen therapy.  Cardiac workup is negative, H&H within normal limits.  Will have to do CTA of the chest, since EKGs normal for atrial fib and will do a CTA of the abdomen pelvis looking for aneurysm.    CTA of the chest shows no PE but patient does have pneumonia.  Patient also has COPD from chronic smoking and hypoxia with oxygen level was low as 77%.  Patient also had diverticulitis, heme-positive stools, GI bleed but patient was is on Eliquis for chronic atrial fib patient.  Discussed with the patient she would have to be transferred to high level care for pulmonology, Cardiology, and gastro neurology.  Will started on antibiotics, lactic level is negative blood pressure was low    Risk  Prescription drug management.               ED Course as of 04/15/24 1847   Mon Apr 15, 2024   1101 POCT ARTERIAL BLOOD GAS(!!) [PW]   1101 Lipase: 17 [PW]   1101 PTT(!): 46.6 [PW]   1101 PT(!): 21.7 [PW]   1101 INR: 1.93 [PW]   1101 Comprehensive metabolic panel(!) [PW]   1113 CBC auto differential(!) [PW]   1127 Occult Blood(!): Positive [PW]   1143 Troponin I High Sensitivity: 5.7 [PW]   1147 Lactic Acid Level: 1.5 [PW]   1203 CTA Chest Non-Coronary (PE Studies) [PW]   1203 CT Abdomen Pelvis With IV Contrast NO Oral Contrast [PW]   1204 CT Abdomen Pelvis With IV Contrast NO Oral Contrast [PW]   1311 Urinalysis, Reflex to Urine Culture Urine, Clean Catch(!) [PW]   1335 Patient accepted this [place] in [place] [PW]   1610 Awaiting room assignment, repeat CBC [PW]   1714 CBC auto differential(!) [PW]      ED Course User Index  [PW] Tristian Elder MD                             Clinical Impression:   Final diagnoses:  [I48.91] Atrial fibrillation  [J96.21] Acute on chronic respiratory failure with hypoxia  [K92.0]  Gastrointestinal hemorrhage with hematemesis (Primary)  [J44.0, J18.9] COPD with pneumonia  [I48.20] Chronic atrial fibrillation  [Z79.01] Chronic anticoagulation        ED Disposition Condition    Transfer to Another Facility Serious                Tristian Elder MD  04/15/24 9969       Tristian Elder MD  04/15/24 6742

## 2024-04-15 NOTE — ED NOTES
ATTEMPTED TO CALL PT REPORT AGAIN WITH NO ANSWER- CALLED [facility] AND NOTIFIED UNABLE TO GET SOMEONE TO ANSWER- EXPLAINED TO HAVE NURSE CALL HERE FOR REPORT - REPORT GIVEN TO CEDRICK CENTENO RN AT THIS TIME

## 2024-04-15 NOTE — ED NOTES
Pt returned to room - dr carmona notified of pt states she is feeling shaky- states can she have something - pt daily drinker md aware-no new orders received at this time

## 2024-04-15 NOTE — ED NOTES
Pt assisted to bathroom - no stool at this time - pt up to voided without difficulty- pt returned to room - monitors applied- urinalysis to lab- pt tolerating iv levaquin

## 2024-04-21 LAB
BACTERIA BLD CULT: NORMAL
BACTERIA BLD CULT: NORMAL

## 2024-04-23 NOTE — ADDENDUM NOTE
Encounter addended by: Katie Quinn on: 4/23/2024 2:24 PM   Actions taken: SmartForm saved, Flowsheet accepted, Charge Capture section accepted

## 2024-04-25 NOTE — ADDENDUM NOTE
Encounter addended by: Laquita Tracey on: 4/25/2024 8:43 AM   Actions taken: Charge Capture section accepted

## 2024-12-16 ENCOUNTER — HOSPITAL ENCOUNTER (EMERGENCY)
Facility: HOSPITAL | Age: 79
Discharge: ANOTHER HOSPITAL | End: 2024-12-16
Attending: SPECIALIST
Payer: MEDICARE

## 2024-12-16 VITALS
HEART RATE: 112 BPM | OXYGEN SATURATION: 90 % | DIASTOLIC BLOOD PRESSURE: 88 MMHG | HEIGHT: 65 IN | TEMPERATURE: 98 F | WEIGHT: 134.69 LBS | SYSTOLIC BLOOD PRESSURE: 179 MMHG | BODY MASS INDEX: 22.44 KG/M2 | RESPIRATION RATE: 20 BRPM

## 2024-12-16 DIAGNOSIS — R11.10 VOMITING: ICD-10-CM

## 2024-12-16 DIAGNOSIS — R19.7 DIARRHEA, UNSPECIFIED TYPE: Primary | ICD-10-CM

## 2024-12-16 DIAGNOSIS — R10.9 ABDOMINAL PAIN, UNSPECIFIED ABDOMINAL LOCATION: ICD-10-CM

## 2024-12-16 LAB
ALBUMIN SERPL BCP-MCNC: 4.1 G/DL (ref 3.4–4.8)
ALBUMIN/GLOB SERPL: 1.2 {RATIO}
ALP SERPL-CCNC: 110 U/L (ref 40–150)
ALT SERPL W P-5'-P-CCNC: 13 U/L
AMPHET UR QL SCN: NEGATIVE
ANION GAP SERPL CALCULATED.3IONS-SCNC: 19 MMOL/L (ref 7–16)
AST SERPL W P-5'-P-CCNC: 25 U/L (ref 5–34)
BARBITURATES UR QL SCN: NEGATIVE
BASOPHILS # BLD AUTO: 0.02 K/UL (ref 0–0.2)
BASOPHILS NFR BLD AUTO: 0.1 % (ref 0–1)
BENZODIAZ METAB UR QL SCN: NEGATIVE
BILIRUB SERPL-MCNC: 1.1 MG/DL
BILIRUB UR QL STRIP: NEGATIVE
BUN SERPL-MCNC: 14 MG/DL (ref 10–20)
BUN/CREAT SERPL: 17 (ref 6–20)
CALCIUM SERPL-MCNC: 9.3 MG/DL (ref 8.4–10.2)
CANNABINOIDS UR QL SCN: NEGATIVE
CHLORIDE SERPL-SCNC: 103 MMOL/L (ref 98–107)
CLARITY UR: CLEAR
CO2 SERPL-SCNC: 22 MMOL/L (ref 23–31)
COCAINE UR QL SCN: NEGATIVE
COLOR UR: YELLOW
CREAT SERPL-MCNC: 0.83 MG/DL (ref 0.55–1.02)
DIFFERENTIAL METHOD BLD: ABNORMAL
EGFR (NO RACE VARIABLE) (RUSH/TITUS): 72 ML/MIN/1.73M2
EOSINOPHIL # BLD AUTO: 0.03 K/UL (ref 0–0.5)
EOSINOPHIL NFR BLD AUTO: 0.2 % (ref 1–4)
ERYTHROCYTE [DISTWIDTH] IN BLOOD BY AUTOMATED COUNT: 13.9 % (ref 11.5–14.5)
ETHANOL SERPL-MCNC: <10 MG/DL
GLOBULIN SER-MCNC: 3.4 G/DL (ref 2–4)
GLUCOSE SERPL-MCNC: 108 MG/DL (ref 82–115)
GLUCOSE UR STRIP-MCNC: NEGATIVE MG/DL
HCT VFR BLD AUTO: 44.5 % (ref 38–47)
HGB BLD-MCNC: 15 G/DL (ref 12–16)
IMM GRANULOCYTES # BLD AUTO: 0.04 K/UL (ref 0–0.04)
IMM GRANULOCYTES NFR BLD: 0.2 % (ref 0–0.4)
KETONES UR STRIP-SCNC: NEGATIVE MG/DL
LEUKOCYTE ESTERASE UR QL STRIP: NEGATIVE
LIPASE SERPL-CCNC: 13 U/L
LYMPHOCYTES # BLD AUTO: 0.75 K/UL (ref 1–4.8)
LYMPHOCYTES NFR BLD AUTO: 4.4 % (ref 27–41)
MAGNESIUM SERPL-MCNC: 2.1 MG/DL (ref 1.6–2.6)
MCH RBC QN AUTO: 31.1 PG (ref 27–31)
MCHC RBC AUTO-ENTMCNC: 33.7 G/DL (ref 32–36)
MCV RBC AUTO: 92.1 FL (ref 80–96)
MONOCYTES # BLD AUTO: 1.35 K/UL (ref 0–0.8)
MONOCYTES NFR BLD AUTO: 7.9 % (ref 2–6)
MPC BLD CALC-MCNC: 10.9 FL (ref 9.4–12.4)
NEUTROPHILS # BLD AUTO: 14.88 K/UL (ref 1.8–7.7)
NEUTROPHILS NFR BLD AUTO: 87.2 % (ref 53–65)
NITRITE UR QL STRIP: NEGATIVE
NRBC # BLD AUTO: 0 X10E3/UL
NRBC, AUTO (.00): 0 %
OPIATES UR QL SCN: NEGATIVE
PCP UR QL SCN: NEGATIVE
PH UR STRIP: 6 PH UNITS
PLATELET # BLD AUTO: 302 K/UL (ref 150–400)
POTASSIUM SERPL-SCNC: 4.2 MMOL/L (ref 3.5–5.1)
PROT SERPL-MCNC: 7.5 G/DL (ref 5.8–7.6)
PROT UR QL STRIP: NEGATIVE
RBC # BLD AUTO: 4.83 M/UL (ref 4.2–5.4)
RBC # UR STRIP: NEGATIVE /UL
SODIUM SERPL-SCNC: 140 MMOL/L (ref 136–145)
SP GR UR STRIP: 1.01
UROBILINOGEN UR STRIP-ACNC: 0.2 MG/DL
WBC # BLD AUTO: 17.07 K/UL (ref 4.5–11)

## 2024-12-16 PROCEDURE — 96361 HYDRATE IV INFUSION ADD-ON: CPT

## 2024-12-16 PROCEDURE — 96366 THER/PROPH/DIAG IV INF ADDON: CPT

## 2024-12-16 PROCEDURE — 82077 ASSAY SPEC XCP UR&BREATH IA: CPT | Performed by: SPECIALIST

## 2024-12-16 PROCEDURE — 80053 COMPREHEN METABOLIC PANEL: CPT | Performed by: SPECIALIST

## 2024-12-16 PROCEDURE — 83690 ASSAY OF LIPASE: CPT | Performed by: SPECIALIST

## 2024-12-16 PROCEDURE — 63600175 PHARM REV CODE 636 W HCPCS: Performed by: SPECIALIST

## 2024-12-16 PROCEDURE — 94761 N-INVAS EAR/PLS OXIMETRY MLT: CPT

## 2024-12-16 PROCEDURE — 36415 COLL VENOUS BLD VENIPUNCTURE: CPT | Performed by: SPECIALIST

## 2024-12-16 PROCEDURE — 96365 THER/PROPH/DIAG IV INF INIT: CPT | Mod: 59

## 2024-12-16 PROCEDURE — 85025 COMPLETE CBC W/AUTO DIFF WBC: CPT | Performed by: SPECIALIST

## 2024-12-16 PROCEDURE — 93010 ELECTROCARDIOGRAM REPORT: CPT | Mod: ,,, | Performed by: INTERNAL MEDICINE

## 2024-12-16 PROCEDURE — 25500020 PHARM REV CODE 255: Performed by: SPECIALIST

## 2024-12-16 PROCEDURE — 27000221 HC OXYGEN, UP TO 24 HOURS

## 2024-12-16 PROCEDURE — 25000003 PHARM REV CODE 250: Performed by: SPECIALIST

## 2024-12-16 PROCEDURE — 80307 DRUG TEST PRSMV CHEM ANLYZR: CPT | Performed by: SPECIALIST

## 2024-12-16 PROCEDURE — 83735 ASSAY OF MAGNESIUM: CPT | Performed by: SPECIALIST

## 2024-12-16 PROCEDURE — 93005 ELECTROCARDIOGRAM TRACING: CPT

## 2024-12-16 PROCEDURE — 99285 EMERGENCY DEPT VISIT HI MDM: CPT | Mod: 25

## 2024-12-16 PROCEDURE — 96375 TX/PRO/DX INJ NEW DRUG ADDON: CPT

## 2024-12-16 PROCEDURE — 99285 EMERGENCY DEPT VISIT HI MDM: CPT | Mod: ,,, | Performed by: SPECIALIST

## 2024-12-16 PROCEDURE — 96368 THER/DIAG CONCURRENT INF: CPT

## 2024-12-16 PROCEDURE — 96376 TX/PRO/DX INJ SAME DRUG ADON: CPT

## 2024-12-16 PROCEDURE — 96367 TX/PROPH/DG ADDL SEQ IV INF: CPT

## 2024-12-16 PROCEDURE — 81003 URINALYSIS AUTO W/O SCOPE: CPT | Performed by: SPECIALIST

## 2024-12-16 RX ORDER — IOPAMIDOL 755 MG/ML
100 INJECTION, SOLUTION INTRAVASCULAR
Status: COMPLETED | OUTPATIENT
Start: 2024-12-16 | End: 2024-12-16

## 2024-12-16 RX ORDER — HYDRALAZINE HYDROCHLORIDE 20 MG/ML
INJECTION INTRAMUSCULAR; INTRAVENOUS
Status: DISCONTINUED
Start: 2024-12-16 | End: 2024-12-16 | Stop reason: HOSPADM

## 2024-12-16 RX ORDER — NYSTATIN 100000 [USP'U]/ML
5 SUSPENSION ORAL
COMMUNITY
Start: 2024-12-04

## 2024-12-16 RX ORDER — CIPROFLOXACIN 2 MG/ML
400 INJECTION, SOLUTION INTRAVENOUS
Status: COMPLETED | OUTPATIENT
Start: 2024-12-16 | End: 2024-12-16

## 2024-12-16 RX ORDER — APIXABAN 2.5 MG/1
2.5 TABLET, FILM COATED ORAL 2 TIMES DAILY
COMMUNITY
Start: 2024-11-30

## 2024-12-16 RX ORDER — THIAMINE HYDROCHLORIDE 100 MG/ML
100 INJECTION, SOLUTION INTRAMUSCULAR; INTRAVENOUS ONCE
Status: COMPLETED | OUTPATIENT
Start: 2024-12-16 | End: 2024-12-16

## 2024-12-16 RX ORDER — DILTIAZEM HYDROCHLORIDE 240 MG/1
240 CAPSULE, COATED, EXTENDED RELEASE ORAL DAILY
COMMUNITY
Start: 2024-11-12

## 2024-12-16 RX ORDER — SODIUM CHLORIDE 9 MG/ML
1000 INJECTION, SOLUTION INTRAVENOUS
Status: COMPLETED | OUTPATIENT
Start: 2024-12-16 | End: 2024-12-16

## 2024-12-16 RX ORDER — PROCHLORPERAZINE EDISYLATE 5 MG/ML
5 INJECTION INTRAMUSCULAR; INTRAVENOUS
Status: COMPLETED | OUTPATIENT
Start: 2024-12-16 | End: 2024-12-16

## 2024-12-16 RX ORDER — METRONIDAZOLE 500 MG/100ML
500 INJECTION, SOLUTION INTRAVENOUS
Status: COMPLETED | OUTPATIENT
Start: 2024-12-16 | End: 2024-12-16

## 2024-12-16 RX ORDER — HYDRALAZINE HYDROCHLORIDE 20 MG/ML
10 INJECTION INTRAMUSCULAR; INTRAVENOUS
Status: COMPLETED | OUTPATIENT
Start: 2024-12-16 | End: 2024-12-16

## 2024-12-16 RX ADMIN — METRONIDAZOLE 500 MG: 5 INJECTION, SOLUTION INTRAVENOUS at 01:12

## 2024-12-16 RX ADMIN — SODIUM CHLORIDE 1000 ML: 9 INJECTION, SOLUTION INTRAVENOUS at 09:12

## 2024-12-16 RX ADMIN — PROCHLORPERAZINE EDISYLATE 5 MG: 5 INJECTION INTRAMUSCULAR; INTRAVENOUS at 09:12

## 2024-12-16 RX ADMIN — THIAMINE HYDROCHLORIDE 100 MG: 100 INJECTION, SOLUTION INTRAMUSCULAR; INTRAVENOUS at 11:12

## 2024-12-16 RX ADMIN — PROCHLORPERAZINE EDISYLATE 5 MG: 5 INJECTION INTRAMUSCULAR; INTRAVENOUS at 12:12

## 2024-12-16 RX ADMIN — CIPROFLOXACIN 400 MG: 2 INJECTION, SOLUTION INTRAVENOUS at 01:12

## 2024-12-16 RX ADMIN — FOLIC ACID 1 MG: 5 INJECTION, SOLUTION INTRAMUSCULAR; INTRAVENOUS; SUBCUTANEOUS at 11:12

## 2024-12-16 RX ADMIN — IOPAMIDOL 100 ML: 755 INJECTION, SOLUTION INTRAVENOUS at 10:12

## 2024-12-16 RX ADMIN — HYDRALAZINE HYDROCHLORIDE 10 MG: 20 INJECTION INTRAMUSCULAR; INTRAVENOUS at 01:12

## 2024-12-16 RX ADMIN — ASCORBIC ACID, VITAMIN A PALMITATE, CHOLECALCIFEROL, THIAMINE HYDROCHLORIDE, RIBOFLAVIN-5 PHOSPHATE SODIUM, PYRIDOXINE HYDROCHLORIDE, NIACINAMIDE, DEXPANTHENOL, ALPHA-TOCOPHEROL ACETATE, VITAMIN K1, FOLIC ACID, BIOTIN, CYANOCOBALAMIN: 200; 3300; 200; 6; 3.6; 6; 40; 15; 10; 150; 600; 60; 5 INJECTION, SOLUTION INTRAVENOUS at 11:12

## 2024-12-16 NOTE — ED NOTES
[facility] here for pt transport to [facility] in [place], [place]. Pt has immediate family members there.

## 2024-12-16 NOTE — ED PROVIDER NOTES
"Encounter Date: 12/16/2024 Patient accepted by ED doctor for admission to [facility] in [facility] per her request. NGT successfully placed by Kat Mcpherson RN to low intermittent suction.          History     Chief Complaint   Patient presents with    Vomiting    Abdominal Pain     Patient wants to go to [facility] due to "all my children are down there".            Patient is a 80 yo wf with n/v/d since midnight and crampy abdominal pain associated with diarrhea.  Patient has a history of Bowel obstruction, HTN, Hypercholesterolemia, COPD, Paroxysmal Afib.  Patient has history of colon resxn, hysterectomy, and a colonoscopy normal on 2022.  Patient drinks alcohol daily.  Patient has had surgery around 15 - 20 years ago. She wishes to go to [place] in [facility] due to "all my children are down there".       Review of patient's allergies indicates:   Allergen Reactions    Penicillins Hives     Past Medical History:   Diagnosis Date    Anemia, iron deficiency     Anticoagulant long-term use     Anxiety disorder, unspecified     Bowel obstruction     COPD (chronic obstructive pulmonary disease)     Hypercholesterolemia     Hypertension     Insomnia     Lumbago with sciatica     Paroxysmal atrial fibrillation     Solitary pulmonary nodule     ct of chest ordered 4/5/24 with iv contrast per crnp     Past Surgical History:   Procedure Laterality Date    breast implants      cataract surgery      COLON RESECTION      colonscopy      2022 at [facility] with normal result per pt    HYSTERECTOMY       No family history on file.  Social History     Tobacco Use    Smoking status: Former    Smokeless tobacco: Never   Substance Use Topics    Alcohol use: Yes     Comment: drinks daily    Drug use: Never     Review of Systems   Constitutional:  Positive for activity change and appetite change.   Eyes: Negative.    Respiratory: Negative.     Gastrointestinal:  Positive for abdominal pain, diarrhea, " nausea and vomiting.   Endocrine: Negative.    Genitourinary: Negative.    Musculoskeletal: Negative.    Skin:  Positive for pallor.   Allergic/Immunologic: Negative.    Neurological:  Positive for weakness.   Hematological: Negative.    Psychiatric/Behavioral: Negative.     All other systems reviewed and are negative.      Physical Exam     Initial Vitals [12/16/24 0841]   BP Pulse Resp Temp SpO2   (!) 155/91 81 18 97.6 °F (36.4 °C) (!) 92 %      MAP       --         Physical Exam    Nursing note and vitals reviewed.  Constitutional: She appears well-developed and well-nourished. She is not diaphoretic. No distress.   HENT:   Head: Normocephalic and atraumatic.   Eyes: Conjunctivae are normal. Pupils are equal, round, and reactive to light.   Neck: Neck supple.   Normal range of motion.  Cardiovascular:  Normal rate, regular rhythm and normal heart sounds.     Exam reveals no gallop and no friction rub.       No murmur heard.  Pulmonary/Chest: Breath sounds normal. No respiratory distress.   Abdominal: She exhibits distension. She exhibits no mass. There is abdominal tenderness.   Absent BS with moderate distension / pain There is no rebound and no guarding.   Musculoskeletal:         General: Normal range of motion.      Cervical back: Normal range of motion and neck supple.     Neurological: She is alert and oriented to person, place, and time. She has normal strength. GCS score is 15. GCS eye subscore is 4. GCS verbal subscore is 5. GCS motor subscore is 6.   Skin: Skin is warm.   Psychiatric: She has a normal mood and affect. Her behavior is normal. Judgment and thought content normal.         Medical Screening Exam   See Full Note    ED Course   Procedures  Labs Reviewed   COMPREHENSIVE METABOLIC PANEL - Abnormal       Result Value    Sodium 140      Potassium 4.2      Chloride 103      CO2 22 (*)     Anion Gap 19 (*)     Glucose 108      BUN 14      Creatinine 0.83      BUN/Creatinine Ratio 17      Calcium  9.3      Total Protein 7.5      Albumin 4.1      Globulin 3.4      A/G Ratio 1.2      Bilirubin, Total 1.1      Alk Phos 110      ALT 13      AST 25      eGFR 72     CBC WITH DIFFERENTIAL - Abnormal    WBC 17.07 (*)     RBC 4.83      Hemoglobin 15.0      Hematocrit 44.5      MCV 92.1      MCH 31.1 (*)     MCHC 33.7      RDW 13.9      Platelet Count 302      MPV 10.9      Neutrophils % 87.2 (*)     Lymphocytes % 4.4 (*)     Monocytes % 7.9 (*)     Eosinophils % 0.2 (*)     Basophils % 0.1      Immature Granulocytes % 0.2      nRBC, Auto 0.0      Neutrophils, Abs 14.88 (*)     Lymphocytes, Absolute 0.75 (*)     Monocytes, Absolute 1.35 (*)     Eosinophils, Absolute 0.03      Basophils, Absolute 0.02      Immature Granulocytes, Absolute 0.04      nRBC, Absolute 0.00      Diff Type Auto     MAGNESIUM - Normal    Magnesium 2.1     ALCOHOL,MEDICAL (ETHANOL) - Normal    Ethanol <10     DRUG SCREEN, URINE (BEAKER) - Normal    Barbiturates, Urine Negative      Benzodiazepine, Urine Negative      Opiates, Urine Negative      Phencyclidine, Urine Negative      Amphetamine, Urine Negative      Cannabinoid, Urine Negative      Cocaine, Urine Negative      Narrative:     This screen includes the following classes of drugs at the listed cut-off:    Benzodiazepines 200 ng/ml  Cocaine metabolite 300 ng/ml  Opiates 2000 ng/ml  Barbiturates 200 ng/ml  Amphetamines 500 ng/ml  Marijuana metabs (THC) 50 ng/ml  Phencyclidine (PCP) 25 ng/ml    This is a screening test. If results do not correlate with clinical presentation, then a confirmatory send out test is advised.   LIPASE - Normal    Lipase 13     CBC W/ AUTO DIFFERENTIAL    Narrative:     The following orders were created for panel order CBC auto differential.  Procedure                               Abnormality         Status                     ---------                               -----------         ------                     CBC with Differential[[id number]]       Abnormal             Final result                 Please view results for these tests on the individual orders.   URINALYSIS, REFLEX TO URINE CULTURE    Color, UA Yellow      Clarity, UA Clear      pH, UA 6.0      Leukocytes, UA Negative      Nitrites, UA Negative      Protein, UA Negative      Glucose, UA Negative      Ketones, UA Negative      Urobilinogen, UA 0.2      Bilirubin, UA Negative      Blood, UA Negative      Specific Gravity, UA 1.010            Imaging Results              X-Ray Chest 1 View for Line/Tube Placement (In process)  Result time 12/16/24 12:19:53   Procedure changed from XR Gastric tube check, non-radiologist performed                     CT Abdomen Pelvis With IV Contrast NO Oral Contrast (Final result)  Result time 12/16/24 11:17:30      Final result by Sourav Jerry MD (12/16/24 11:17:30)                   Impression:      1. Findings concerning for acute mechanical small bowel obstruction, suggest transition point in the deep midline low pelvis.  2. Additional details of chronic and incidental findings, as provided in the body of the report.  This report was flagged in Epic as abnormal.      Electronically signed by: Sourav Jerry  Date:    12/16/2024  Time:    11:17               Narrative:    EXAMINATION:  CT ABDOMEN PELVIS WITH IV CONTRAST    CLINICAL HISTORY:  Abdominal pain, acute, nonlocalized;Patient with history of obstruction/ colectomy;    TECHNIQUE:  Low dose axial images, sagittal and coronal reformations were obtained from the lung bases to the pubic symphysis following the IV administration of 100 mL of Isovue-370.    COMPARISON:  CT of the abdomen and pelvis performed 04/15/2024.    FINDINGS:  Lower chest: Atelectasis and/or scar at the visualized lung bases    Liver: Unremarkable.    Gallbladder and bile ducts: Unremarkable. No biliary ductal dilatation.    Pancreas: Unremarkable.    Spleen: Normal contour.  Suspected calcified granuloma.    Adrenals:  Unremarkable.    Kidneys: Unremarkable.    Lymph nodes: No abdominal or pelvic lymphadenopathy.    Bowel and mesentery: Small hiatal hernia.Dilated fluid-filled small bowel loops measuring up to at least 39 mm with areas of fecalization.  Suggested transition to decompressed distal small bowel loops within the deep midline low pelvis for example seen on axial source series 201, image 63; coronal reformat series 202, image 36).    Colon appears normal caliber with evidence of diverticulosis.  Nonspecific areas of submucosal fat attenuation involving the proximal colon and terminal ileum, may reflect sequela of chronic nonspecific inflammation.  Appendix not confidently identified.  No definite focal pericecal inflammation.    Abdominal aorta: Nonaneurysmal.  Moderate to heavy atherosclerotic calcifications.    Inferior vena cava: Unremarkable.    Free fluid or free air: None.    Pelvis: Unremarkable.    Body wall: Remote operative sequela noted in the anterior abdominal wall.  Partially imaged bilateral breast implants.    Bones: Query osseous demineralization.  No definite acute findings.  Degenerative findings of the spine hips.                                       Medications   ciprofloxacin (CIPRO)400mg/200ml D5W IVPB 400 mg (400 mg Intravenous New Bag 12/16/24 1315)   metronidazole IVPB 500 mg (500 mg Intravenous New Bag 12/16/24 1313)   0.9% NaCl infusion (1,000 mLs Intravenous New Bag 12/16/24 0916)   prochlorperazine injection Soln 5 mg (5 mg Intravenous Given 12/16/24 0917)   0.9% NaCl 1,000 mL with mvi, (ADULT) no.4 with vit K 3,300 unit- 150 mcg/10 mL 10 mL infusion ( Intravenous New Bag 12/16/24 1101)     And   folic acid 1 mg in 0.9% NaCl 100 mL IVPB (1 mg Intravenous New Bag 12/16/24 1103)     And   thiamine injection 100 mg (100 mg Intravenous Given 12/16/24 1101)   iopamidoL (ISOVUE-370) injection 100 mL (100 mLs Intravenous Given 12/16/24 1041)   prochlorperazine injection Soln 5 mg (5 mg  Intravenous Given 12/16/24 1210)     Medical Decision Making  78 yo wf with alcohol dependency n/v/d  - history of obstruction in past and will need to r/o Viral gastroenteritis in town and diverticulitis are main concerns. Pancreatitis due to alcohol dependency as well.    Amount and/or Complexity of Data Reviewed  Labs: ordered. Decision-making details documented in ED Course.  Radiology: ordered. Decision-making details documented in ED Course.  ECG/medicine tests:  Decision-making details documented in ED Course.    Risk  Prescription drug management.               ED Course as of 12/16/24 1319   Mon Dec 16, 2024   1146 Patient is doing well on IVF's and compazine.  Will add IV Compazine 5 mg  [VB]      ED Course User Index  [VB] Katie Hawkins MD                           Clinical Impression:   Final diagnoses:  [R11.10] Vomiting  [R19.7] Diarrhea, unspecified type (Primary)  [R10.9] Abdominal pain, unspecified abdominal location        ED Disposition Condition    Transfer to Another Facility Stable                Katie Hawkins MD  12/16/24 1210       Katie Hawkins MD  12/16/24 [id number]

## 2024-12-16 NOTE — ED TRIAGE NOTES
N/v/d that started at 1200 midnight. Last meal 12/15 at 1600. Denies ill contacts. Has abd pain that is worse just before vomiting and better afterwards. BS are decreased. Noted scarring to lower abd. Pt states that she has a hx of bowel obstruction in the past.

## 2024-12-17 LAB
OHS QRS DURATION: 76 MS
OHS QTC CALCULATION: 474 MS

## 2025-03-11 ENCOUNTER — HOSPITAL ENCOUNTER (EMERGENCY)
Facility: HOSPITAL | Age: 80
Discharge: HOME | End: 2025-03-11
Attending: EMERGENCY MEDICINE
Payer: MEDICARE

## 2025-03-11 VITALS
HEIGHT: 65 IN | RESPIRATION RATE: 18 BRPM | SYSTOLIC BLOOD PRESSURE: 141 MMHG | TEMPERATURE: 98 F | OXYGEN SATURATION: 90 % | WEIGHT: 136.81 LBS | HEART RATE: 107 BPM | BODY MASS INDEX: 22.8 KG/M2 | DIASTOLIC BLOOD PRESSURE: 92 MMHG

## 2025-03-11 DIAGNOSIS — J44.9 CHRONIC OBSTRUCTIVE PULMONARY DISEASE, UNSPECIFIED COPD TYPE: ICD-10-CM

## 2025-03-11 DIAGNOSIS — N39.0 BACTERIAL URINARY INFECTION: Primary | ICD-10-CM

## 2025-03-11 DIAGNOSIS — A49.9 BACTERIAL URINARY INFECTION: Primary | ICD-10-CM

## 2025-03-11 DIAGNOSIS — R19.7 NAUSEA VOMITING AND DIARRHEA: ICD-10-CM

## 2025-03-11 DIAGNOSIS — D72.828 CHRONIC NEUTROPHILIA: ICD-10-CM

## 2025-03-11 DIAGNOSIS — R09.02 HYPOXIA: ICD-10-CM

## 2025-03-11 DIAGNOSIS — A08.4 VIRAL GASTROENTERITIS: ICD-10-CM

## 2025-03-11 DIAGNOSIS — R11.2 NAUSEA VOMITING AND DIARRHEA: ICD-10-CM

## 2025-03-11 LAB
ALBUMIN SERPL BCP-MCNC: 4.6 G/DL (ref 3.4–4.8)
ALBUMIN/GLOB SERPL: 1.2 {RATIO}
ALP SERPL-CCNC: 111 U/L (ref 40–150)
ALT SERPL W P-5'-P-CCNC: 10 U/L
AMYLASE SERPL-CCNC: 23 U/L (ref 20–160)
ANION GAP SERPL CALCULATED.3IONS-SCNC: 22 MMOL/L (ref 7–16)
AST SERPL W P-5'-P-CCNC: 18 U/L (ref 11–45)
BACTERIA #/AREA URNS HPF: ABNORMAL /HPF
BASOPHILS # BLD AUTO: 0.04 K/UL (ref 0–0.2)
BASOPHILS NFR BLD AUTO: 0.2 % (ref 0–1)
BILIRUB SERPL-MCNC: 1 MG/DL
BILIRUB UR QL STRIP: ABNORMAL
BUN SERPL-MCNC: 17 MG/DL (ref 10–20)
BUN/CREAT SERPL: 12 (ref 6–20)
CALCIUM SERPL-MCNC: 9.9 MG/DL (ref 8.4–10.2)
CAOX CRY #/AREA URNS LPF: ABNORMAL /LPF
CHLORIDE SERPL-SCNC: 96 MMOL/L (ref 98–107)
CLARITY UR: CLEAR
CO2 SERPL-SCNC: 27 MMOL/L (ref 23–31)
COLOR UR: YELLOW
CREAT SERPL-MCNC: 1.41 MG/DL (ref 0.55–1.02)
D DIMER PPP FEU-MCNC: 0.36 ΜG/ML (ref 0–0.47)
DIFFERENTIAL METHOD BLD: ABNORMAL
EGFR (NO RACE VARIABLE) (RUSH/TITUS): 38 ML/MIN/1.73M2
EOSINOPHIL # BLD AUTO: 0.04 K/UL (ref 0–0.5)
EOSINOPHIL NFR BLD AUTO: 0.2 % (ref 1–4)
ERYTHROCYTE [DISTWIDTH] IN BLOOD BY AUTOMATED COUNT: 15 % (ref 11.5–14.5)
FINE GRAN CASTS #/AREA URNS LPF: ABNORMAL /LPF
GLOBULIN SER-MCNC: 4 G/DL (ref 2–4)
GLUCOSE SERPL-MCNC: 160 MG/DL (ref 82–115)
GLUCOSE UR STRIP-MCNC: NEGATIVE MG/DL
HCO3 UR-SCNC: 32.8 MMOL/L (ref 21–28)
HCT VFR BLD AUTO: 45.4 % (ref 38–47)
HGB BLD-MCNC: 15.5 G/DL (ref 12–16)
IMM GRANULOCYTES # BLD AUTO: 0.07 K/UL (ref 0–0.04)
IMM GRANULOCYTES NFR BLD: 0.3 % (ref 0–0.4)
KETONES UR STRIP-SCNC: ABNORMAL MG/DL
LEUKOCYTE ESTERASE UR QL STRIP: ABNORMAL
LIPASE SERPL-CCNC: 18 U/L
LYMPHOCYTES # BLD AUTO: 0.87 K/UL (ref 1–4.8)
LYMPHOCYTES NFR BLD AUTO: 4 % (ref 27–41)
MAGNESIUM SERPL-MCNC: 2 MG/DL (ref 1.6–2.6)
MCH RBC QN AUTO: 31.8 PG (ref 27–31)
MCHC RBC AUTO-ENTMCNC: 34.1 G/DL (ref 32–36)
MCV RBC AUTO: 93 FL (ref 80–96)
MONOCYTES # BLD AUTO: 1.2 K/UL (ref 0–0.8)
MONOCYTES NFR BLD AUTO: 5.5 % (ref 2–6)
MPC BLD CALC-MCNC: 10.9 FL (ref 9.4–12.4)
MUCOUS THREADS #/AREA URNS HPF: ABNORMAL /HPF
NEUTROPHILS # BLD AUTO: 19.75 K/UL (ref 1.8–7.7)
NEUTROPHILS NFR BLD AUTO: 89.8 % (ref 53–65)
NITRITE UR QL STRIP: NEGATIVE
NRBC # BLD AUTO: 0 X10E3/UL
NRBC, AUTO (.00): 0 %
PCO2 BLDA: 42 MMHG (ref 35–48)
PH SMN: 7.5 [PH] (ref 7.35–7.45)
PH UR STRIP: 7.5 PH UNITS
PLATELET # BLD AUTO: 343 K/UL (ref 150–400)
PO2 BLDA: 44 MMHG (ref 83–108)
POC BASE EXCESS: 8.7 MMOL/L (ref -2–3)
POC SATURATED O2: 84 %
POTASSIUM SERPL-SCNC: 4.4 MMOL/L (ref 3.5–5.1)
PROT SERPL-MCNC: 8.6 G/DL (ref 5.8–7.6)
PROT UR QL STRIP: 30
RBC # BLD AUTO: 4.88 M/UL (ref 4.2–5.4)
RBC # UR STRIP: NEGATIVE /UL
RBC #/AREA URNS HPF: ABNORMAL /HPF
SODIUM SERPL-SCNC: 141 MMOL/L (ref 136–145)
SP GR UR STRIP: 1.01
SQUAMOUS #/AREA URNS LPF: ABNORMAL /LPF
TRANS CELLS #/AREA URNS LPF: ABNORMAL /LPF
TROPONIN I SERPL HS-MCNC: <2.7 NG/L
UROBILINOGEN UR STRIP-ACNC: 0.2 MG/DL
WBC # BLD AUTO: 21.97 K/UL (ref 4.5–11)
WBC #/AREA URNS HPF: ABNORMAL /HPF

## 2025-03-11 PROCEDURE — 94640 AIRWAY INHALATION TREATMENT: CPT

## 2025-03-11 PROCEDURE — 85025 COMPLETE CBC W/AUTO DIFF WBC: CPT | Performed by: EMERGENCY MEDICINE

## 2025-03-11 PROCEDURE — 81001 URINALYSIS AUTO W/SCOPE: CPT | Performed by: EMERGENCY MEDICINE

## 2025-03-11 PROCEDURE — 84484 ASSAY OF TROPONIN QUANT: CPT | Performed by: EMERGENCY MEDICINE

## 2025-03-11 PROCEDURE — 94761 N-INVAS EAR/PLS OXIMETRY MLT: CPT

## 2025-03-11 PROCEDURE — 99900035 HC TECH TIME PER 15 MIN (STAT)

## 2025-03-11 PROCEDURE — 83690 ASSAY OF LIPASE: CPT | Performed by: EMERGENCY MEDICINE

## 2025-03-11 PROCEDURE — 82150 ASSAY OF AMYLASE: CPT | Performed by: EMERGENCY MEDICINE

## 2025-03-11 PROCEDURE — 27000221 HC OXYGEN, UP TO 24 HOURS

## 2025-03-11 PROCEDURE — 83735 ASSAY OF MAGNESIUM: CPT | Performed by: EMERGENCY MEDICINE

## 2025-03-11 PROCEDURE — 63600175 PHARM REV CODE 636 W HCPCS: Performed by: EMERGENCY MEDICINE

## 2025-03-11 PROCEDURE — 25000242 PHARM REV CODE 250 ALT 637 W/ HCPCS: Performed by: EMERGENCY MEDICINE

## 2025-03-11 PROCEDURE — 80053 COMPREHEN METABOLIC PANEL: CPT | Performed by: EMERGENCY MEDICINE

## 2025-03-11 PROCEDURE — 25000003 PHARM REV CODE 250: Performed by: EMERGENCY MEDICINE

## 2025-03-11 PROCEDURE — 85379 FIBRIN DEGRADATION QUANT: CPT | Performed by: EMERGENCY MEDICINE

## 2025-03-11 PROCEDURE — 93005 ELECTROCARDIOGRAM TRACING: CPT

## 2025-03-11 PROCEDURE — 36600 WITHDRAWAL OF ARTERIAL BLOOD: CPT

## 2025-03-11 RX ORDER — PROCHLORPERAZINE EDISYLATE 5 MG/ML
5 INJECTION INTRAMUSCULAR; INTRAVENOUS
Status: COMPLETED | OUTPATIENT
Start: 2025-03-11 | End: 2025-03-11

## 2025-03-11 RX ORDER — ONDANSETRON 4 MG/1
4 TABLET, ORALLY DISINTEGRATING ORAL EVERY 6 HOURS PRN
Qty: 12 TABLET | Refills: 0 | Status: SHIPPED | OUTPATIENT
Start: 2025-03-11 | End: 2025-03-14

## 2025-03-11 RX ORDER — LEVOFLOXACIN 5 MG/ML
500 INJECTION, SOLUTION INTRAVENOUS
Status: DISCONTINUED | OUTPATIENT
Start: 2025-03-11 | End: 2025-03-11 | Stop reason: HOSPADM

## 2025-03-11 RX ORDER — DEXAMETHASONE SODIUM PHOSPHATE 4 MG/ML
8 INJECTION, SOLUTION INTRA-ARTICULAR; INTRALESIONAL; INTRAMUSCULAR; INTRAVENOUS; SOFT TISSUE
Status: COMPLETED | OUTPATIENT
Start: 2025-03-11 | End: 2025-03-11

## 2025-03-11 RX ORDER — METRONIDAZOLE 500 MG/1
500 TABLET ORAL EVERY 12 HOURS
Qty: 14 TABLET | Refills: 0 | Status: SHIPPED | OUTPATIENT
Start: 2025-03-11 | End: 2025-03-18

## 2025-03-11 RX ORDER — LEVOFLOXACIN 500 MG/1
500 TABLET, FILM COATED ORAL DAILY
Qty: 5 TABLET | Refills: 0 | Status: SHIPPED | OUTPATIENT
Start: 2025-03-11 | End: 2025-03-16

## 2025-03-11 RX ORDER — IPRATROPIUM BROMIDE AND ALBUTEROL SULFATE 2.5; .5 MG/3ML; MG/3ML
3 SOLUTION RESPIRATORY (INHALATION)
Status: COMPLETED | OUTPATIENT
Start: 2025-03-11 | End: 2025-03-11

## 2025-03-11 RX ORDER — CEFTRIAXONE 1 G/1
1 INJECTION, POWDER, FOR SOLUTION INTRAMUSCULAR; INTRAVENOUS
Status: DISCONTINUED | OUTPATIENT
Start: 2025-03-11 | End: 2025-03-11

## 2025-03-11 RX ADMIN — IPRATROPIUM BROMIDE AND ALBUTEROL SULFATE 3 ML: .5; 3 SOLUTION RESPIRATORY (INHALATION) at 06:03

## 2025-03-11 RX ADMIN — DEXAMETHASONE SODIUM PHOSPHATE 8 MG: 4 INJECTION, SOLUTION INTRA-ARTICULAR; INTRALESIONAL; INTRAMUSCULAR; INTRAVENOUS; SOFT TISSUE at 06:03

## 2025-03-11 RX ADMIN — IPRATROPIUM BROMIDE AND ALBUTEROL SULFATE 3 ML: .5; 3 SOLUTION RESPIRATORY (INHALATION) at 03:03

## 2025-03-11 RX ADMIN — PROCHLORPERAZINE EDISYLATE 5 MG: 5 INJECTION INTRAMUSCULAR; INTRAVENOUS at 03:03

## 2025-03-11 RX ADMIN — PROCHLORPERAZINE EDISYLATE 5 MG: 5 INJECTION INTRAMUSCULAR; INTRAVENOUS at 09:03

## 2025-03-11 RX ADMIN — SODIUM CHLORIDE 1000 ML: 9 INJECTION, SOLUTION INTRAVENOUS at 03:03

## 2025-03-11 RX ADMIN — LEVOFLOXACIN 500 MG: 500 INJECTION, SOLUTION INTRAVENOUS at 06:03

## 2025-03-11 NOTE — ED PROVIDER NOTES
"Encounter Date: 3/11/2025       History     Chief Complaint   Patient presents with    Emesis    Diarrhea     Patient presents with nausea vomiting and diarrhea that started yesterday, patient states he has been "throwing up all night".  Had some abdominal cramping earlier, no abdominal pain currently.  No melena or hematochezia.  No hematemesis.  Has a history of bowel obstruction in the past, patient states it does not feel like when she had a bowel obstruction.      Review of patient's allergies indicates:   Allergen Reactions    Penicillins Hives     Past Medical History:   Diagnosis Date    Anemia, iron deficiency     Anticoagulant long-term use     Anxiety disorder, unspecified     Bowel obstruction     COPD (chronic obstructive pulmonary disease)     Hypercholesterolemia     Hypertension     Insomnia     Lumbago with sciatica     Paroxysmal atrial fibrillation     Solitary pulmonary nodule     ct of chest ordered 4/5/24 with iv contrast per crnp     Past Surgical History:   Procedure Laterality Date    breast implants      cataract surgery      COLON RESECTION      colonscopy      2022 at [facility] with normal result per pt    HYSTERECTOMY       No family history on file.  Social History[1]  Review of Systems   Constitutional:  Positive for appetite change (poor appetite since yesterday). Negative for activity change, chills, diaphoresis, fatigue and fever.   HENT: Negative.     Eyes: Negative.    Respiratory: Negative.  Negative for apnea, cough, choking, chest tightness, shortness of breath, wheezing and stridor.         Patient reports a history of COPD.  Patient is an ex-smoker.   Cardiovascular: Negative.  Negative for chest pain and leg swelling.   Gastrointestinal:  Positive for abdominal pain, diarrhea, nausea and vomiting. Negative for abdominal distention, anal bleeding, blood in stool, constipation and rectal pain.   Genitourinary: Negative.    Musculoskeletal: Negative.    Skin: Negative.  "   Neurological: Negative.    Psychiatric/Behavioral: Negative.     All other systems reviewed and are negative.      Physical Exam     Initial Vitals [03/11/25 0248]   BP Pulse Resp Temp SpO2   100/61 (!) 112 13 97.7 °F (36.5 °C) (!) 83 %      MAP       --         Physical Exam    Nursing note and vitals reviewed.  Constitutional: She appears well-developed and well-nourished. No distress.   HENT:   Right Ear: External ear normal.   Left Ear: External ear normal.   Nose: Nose normal. Mouth/Throat: Mucous membranes are dry.   Eyes: Conjunctivae and EOM are normal. Pupils are equal, round, and reactive to light. No scleral icterus.   Neck: Neck supple. No JVD present.   Normal range of motion.  Cardiovascular:  Normal rate, regular rhythm, normal heart sounds and intact distal pulses.           No murmur heard.  Pulmonary/Chest: Breath sounds normal. No stridor. No respiratory distress. She has no wheezes. She has no rhonchi. She has no rales.   Abdominal: Abdomen is soft. Bowel sounds are normal. She exhibits no distension and no mass. There is no abdominal tenderness. There is no rebound and no guarding.   Musculoskeletal:         General: No tenderness or edema. Normal range of motion.      Cervical back: Normal range of motion and neck supple.     Lymphadenopathy:     She has no cervical adenopathy.   Neurological: She is alert and oriented to person, place, and time. She has normal strength. No cranial nerve deficit. GCS score is 15. GCS eye subscore is 4. GCS verbal subscore is 5. GCS motor subscore is 6.   Skin: Skin is dry. Capillary refill takes 2 to 3 seconds. No rash noted. No erythema. No pallor.   Psychiatric: She has a normal mood and affect. Her behavior is normal.         Medical Screening Exam   See Full Note    ED Course   Procedures  Labs Reviewed   COMPREHENSIVE METABOLIC PANEL - Abnormal       Result Value    Sodium 141      Potassium 4.4      Chloride 96 (*)     CO2 27      Anion Gap 22 (*)      Glucose 160 (*)     BUN 17      Creatinine 1.41 (*)     BUN/Creatinine Ratio 12      Calcium 9.9      Total Protein 8.6 (*)     Albumin 4.6      Globulin 4.0      A/G Ratio 1.2      Bilirubin, Total 1.0      Alk Phos 111      ALT 10      AST 18      eGFR 38 (*)    URINALYSIS, REFLEX TO URINE CULTURE - Abnormal    Color, UA Yellow      Clarity, UA Clear      pH, UA 7.5      Leukocytes, UA Small (*)     Nitrites, UA Negative      Protein, UA 30 (*)     Glucose, UA Negative      Ketones, UA Trace      Urobilinogen, UA 0.2      Bilirubin, UA Small (*)     Blood, UA Negative      Specific Gravity, UA 1.015     CBC WITH DIFFERENTIAL - Abnormal    WBC 21.97 (*)     RBC 4.88      Hemoglobin 15.5      Hematocrit 45.4      MCV 93.0      MCH 31.8 (*)     MCHC 34.1      RDW 15.0 (*)     Platelet Count 343      MPV 10.9      Neutrophils % 89.8 (*)     Lymphocytes % 4.0 (*)     Monocytes % 5.5      Eosinophils % 0.2 (*)     Basophils % 0.2      Immature Granulocytes % 0.3      nRBC, Auto 0.0      Neutrophils, Abs 19.75 (*)     Lymphocytes, Absolute 0.87 (*)     Monocytes, Absolute 1.20 (*)     Eosinophils, Absolute 0.04      Basophils, Absolute 0.04      Immature Granulocytes, Absolute 0.07 (*)     nRBC, Absolute 0.00      Diff Type Auto     URINALYSIS, MICROSCOPIC - Abnormal    WBC, UA 5-10 (*)     RBC, UA 0-3      Bacteria, UA Few (*)     Squamous Epithelial Cells, UA Few (*)     Transitional Epithelial Cells, UA Occasional (*)     Mucus, UA Rare (*)     Fine Granular Casts, UA 0-2 (*)     Calcium Oxalate Crystals, UA Moderate (*)    AMYLASE - Normal    Amylase 23     D DIMER, QUANTITATIVE - Normal    D-Dimer 0.36     LIPASE - Normal    Lipase 18     MAGNESIUM - Normal    Magnesium 2.0     TROPONIN I - Normal    Troponin I High Sensitivity <2.7     CBC W/ AUTO DIFFERENTIAL    Narrative:     The following orders were created for panel order CBC auto differential.  Procedure                               Abnormality          Status                     ---------                               -----------         ------                     CBC with Differential[[id number]]       Abnormal            Final result                 Please view results for these tests on the individual orders.          Imaging Results              X-Ray Abdomen Flat And Erect (Final result)  Result time 03/11/25 10:02:15      Final result by Jovan Calabrese MD (03/11/25 10:02:15)                   Narrative:    EXAMINATION:  XR ABDOMEN FLAT AND ERECT    CLINICAL HISTORY:  Nausea with vomiting, unspecified    TECHNIQUE:  Flat and erect AP views of the abdomen were performed.    COMPARISON:  None    FINDINGS:  There is no evidence of free air upon upright positioning.  There is apparent relative elevation of the left hemidiaphragm.  The bowel gas pattern is nonspecific and nonobstructive.  Phleboliths are seen in the pelvis and degenerative changes are seen in the spine is and hips.      Electronically signed by: Jovan Calabrese MD  Date:    03/11/2025  Time:    10:02                                     X-Ray Chest PA And Lateral (Final result)  Result time 03/11/25 08:25:27      Final result by Jovan Calabrese MD (03/11/25 08:25:27)                   Impression:      Chronic apparent relative elevation left hemidiaphragm with probable atelectasis versus scarring in the left base.    No definite new airspace disease is seen in this patient with underlying demineralization and hyper aeration suggesting COPD.      Electronically signed by: Jovan Calabrese MD  Date:    03/11/2025  Time:    08:25               Narrative:    EXAMINATION:  XR CHEST PA AND LATERAL    CLINICAL HISTORY:  Hypoxemia    TECHNIQUE:  PA and lateral views of the chest were performed.    COMPARISON:  December 16, 2024    FINDINGS:  There is chronic apparent relative elevation of the left hemidiaphragm.  Atelectasis and scarring are suggested in the left base.  There is some degree of hyper  aeration.  There is diffuse demineralization.  Degenerative changes are seen in the spine.  No confluent infiltrates are seen at this time.                                       Medications   levoFLOXacin 500 mg/100 mL IVPB 500 mg (0 mg Intravenous Stopped 3/11/25 0746)   sodium chloride 0.9% bolus 1,000 mL 1,000 mL ( Intravenous Stopped 3/11/25 0414)   albuterol-ipratropium 2.5 mg-0.5 mg/3 mL nebulizer solution 3 mL (3 mLs Nebulization Given 3/11/25 0313)   prochlorperazine injection Soln 5 mg (5 mg Intravenous Given 3/11/25 0306)   albuterol-ipratropium 2.5 mg-0.5 mg/3 mL nebulizer solution 3 mL (3 mLs Nebulization Given 3/11/25 0638)   dexAMETHasone injection 8 mg (8 mg Intravenous Given 3/11/25 0639)   prochlorperazine injection Soln 5 mg (5 mg Intravenous Given 3/11/25 0955)     Medical Decision Making  Differential diagnosis includes viral gastroenteritis, food-borne illness, COPD with exacerbation.  Volume depletion, urinary infection, electrolyte abnormality.  Patient had O2 sat of 83% on room air on arrival, improved to 5-87% after nebulizer treatment.  O2 sat on room air has been 85-88%, drops to 85% on exertion.  Patient was started on oxygen by nasal cannula, O2 sat improved to 91%.  O2 sat is currently 93% on 4 L. patient was given Levaquin IV for urinary infection and possible infectious diarrhea.  Patient has not had any further nausea or vomiting here in the emergency department.    Patient was given a 2nd nebulizer treatment and Decadron IV.  O2 sat has improved to 91% on 2 L oxygen by nasal cannula.  Home oxygen arranged, referral to Hematology for chronic neutrophilia as noted in review of electronic medical record, Levaquin prescription for urinary infection.  Recommend further evaluation by pulmonology for baseline hypoxia with COPD.  Discharge and follow up instructions given.        Amount and/or Complexity of Data Reviewed  External Data Reviewed: labs.     Details: Review of patient's  previous visits to the emergency department here over the last 1-2 years indicate chronically elevated white blood [place] [id number]  Labs: ordered. Decision-making details documented in ED Course.  Radiology: ordered and independent interpretation performed. Decision-making details documented in ED Course.  ECG/medicine tests: ordered and independent interpretation performed.     Details: EKG shows normal sinus rhythm with a rate 93.  Possible age indeterminate inferior and anterior infarcts.    Risk  Prescription drug management.               ED Course as of 03/11/25 1140   Tue Mar 11, 2025   0620 POCT ARTERIAL BLOOD GAS(!!)  Arterial blood gas shows pH 7.50 with pCO2 42, PO2 44, bicarb is 32.8. [LM]   0621 D dimer, quantitative  D-dimer is normal at 0.36 [LM]   0621 Comprehensive metabolic panel(!)  CMP shows normal sodium and potassium, normal serum CO2.  Glucose is elevated at 160.  BUN 17 with creatinine 1.41, estimated GFR is 38.  Albumin, bilirubin, alkaline phosphatase, ALT, and AST are all normal. [LM]   0621 Lipase  Lipase is normal [LM]   0621 Amylase  Amylase is normal [LM]   0621 Magnesium  Magnesium level is normal [LM]   0621 Troponin I  Troponin is normal at less than 2.7. [LM]   0621 Urinalysis, Reflex to Urine Culture(!)  Urinalysis shows small leukocyte esterase, nitrite negative. [LM]   0622 Urinalysis, Microscopic(!)  Microscopic urinalysis shows 5-10 WBCs with 0-3 RBCs and few bacteria. [LM]   0622 X-Ray Abdomen Flat And Erect  Review of flat upright abdominal x-ray shows no air-fluid levels, no dilated loops of bowel, air noted in the stomach. [LM]   0625 X-Ray Chest PA And Lateral  Review of PA and lateral chest x-ray shows chronic lung changes, no acute process. [LM]   0952 CBC auto differential(!)  CBC shows increased white blood cell count 21.97, hemoglobin is normal at 15 and 45.  Platelet count is 343.  89% neutrophils and 4% lymphocytes.  Review of previous labs  indicate patient appears to have a baseline white blood cell count around 20,000.  Recommend follow up with Hematology for further evaluation. [LM]   1139 X-Ray Abdomen Flat And Erect  Review of radiologist's report for flat and upright abdominal x-ray shows nonspecific bowel gas pattern which is nonobstructive.  Elevated left hemidiaphragm noted as in previous films as well.  No acute process reported. [LM]      ED Course User Index  [LM] Sherman Mueller DO                           Clinical Impression:   Final diagnoses:  [R11.2, R19.7] Nausea vomiting and diarrhea  [R09.02] Hypoxia  [N39.0, A49.9] Bacterial urinary infection (Primary)  [J44.9] Chronic obstructive pulmonary disease, unspecified COPD type - With hypoxia, with O2 sat 85-88% at rest on room air  [D72.828] Chronic neutrophilia  [A08.4] Viral gastroenteritis        ED Disposition Condition    Discharge Stable          ED Prescriptions       Medication Sig Dispense Start Date End Date Auth. Provider    levoFLOXacin (LEVAQUIN) 500 MG tablet Take 1 tablet (500 mg total) by mouth once daily. for 5 days 5 tablet 3/11/2025 3/16/2025 Sherman Mueller DO    metroNIDAZOLE (FLAGYL) 500 MG tablet Take 1 tablet (500 mg total) by mouth every 12 (twelve) hours. for 7 days 14 tablet 3/11/2025 3/18/2025 Sherman Mueller DO    ondansetron (ZOFRAN-ODT) 4 MG TbDL Take 1 tablet (4 mg total) by mouth every 6 (six) hours as needed (Nausea or vomiting). 12 tablet 3/11/2025 3/14/2025 Sherman Mueller DO          Follow-up Information       Follow up With Specialties Details Why Contact Info    Maycol Loredo, DO Family Medicine Schedule an appointment as soon as possible for a visit in 1 day To recheck; sooner if worse, not improving, or if any new symptoms.  Also to talk to your doctor about a referral to Hematology for chronically elevated white blood cell count, and referral to pulmonology for hypoxia now requiring oxygen supplementation. [id number] [facility]  [address] [facility] [address]  [phone]                 Sherman Mueller, DO  03/11/25 1021         Sherman Mueller, DO  03/11/25 1021       [1]   Social History  Tobacco Use    Smoking status: Former    Smokeless tobacco: Never   Substance Use Topics    Alcohol use: Yes     Comment: drinks daily    Drug use: Never        Sherman Mueller,   03/11/25 1140

## 2025-03-11 NOTE — PROGRESS NOTES
03:20:  A ABG WAS DONE IN ER - 01.  ABG OBTAINED FROM RT BRACHIAL ARTERY X 1 STICK.  PRESSURE HELD WITH OUT HEMATOMA NOTED.  PRESSURE BANDAGE APPLIED.  KS CRT

## 2025-03-11 NOTE — ED TRIAGE NOTES
Pt presents with vomiting and diarrhea that started last night around 8pm. Pt O2 sats upon arrival are 83%. No SOB reported/ visualized. Denies smoking. No home O2.

## 2025-03-11 NOTE — ED NOTES
Rx for levaquin 500mg qdx5 days called in to [facility] pharmacy in [place], [place] [phone]